# Patient Record
Sex: FEMALE | Race: OTHER | HISPANIC OR LATINO | URBAN - METROPOLITAN AREA
[De-identification: names, ages, dates, MRNs, and addresses within clinical notes are randomized per-mention and may not be internally consistent; named-entity substitution may affect disease eponyms.]

---

## 2020-08-11 ENCOUNTER — EMERGENCY (EMERGENCY)
Facility: HOSPITAL | Age: 20
LOS: 1 days | Discharge: HOME | End: 2020-08-11
Admitting: EMERGENCY MEDICINE
Payer: MEDICAID

## 2020-08-11 VITALS
RESPIRATION RATE: 18 BRPM | SYSTOLIC BLOOD PRESSURE: 107 MMHG | HEART RATE: 95 BPM | OXYGEN SATURATION: 97 % | TEMPERATURE: 99 F | DIASTOLIC BLOOD PRESSURE: 68 MMHG

## 2020-08-11 PROCEDURE — 99284 EMERGENCY DEPT VISIT MOD MDM: CPT | Mod: 25

## 2020-08-11 PROCEDURE — 99053 MED SERV 10PM-8AM 24 HR FAC: CPT

## 2020-08-11 PROCEDURE — 93010 ELECTROCARDIOGRAM REPORT: CPT
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
## 2020-08-11 NOTE — ED PROVIDER NOTE - PATIENT PORTAL LINK FT
You can access the FollowMyHealth Patient Portal offered by [facility] by registering at the following website: [email or web address]. By joining [email or web address]’s FollowMyHealth portal, you will also be able to view your health information using other applications (apps) compatible with our system.

## 2020-08-11 NOTE — ED ADULT TRIAGE NOTE - CHIEF COMPLAINT QUOTE
Pt. with PMHx of bipolar disorder non compliant with her medication c/o suicide ideation with plan to "cut my wrists open." Endorses auditory hallucinations and states the voices are telling her "I need to die." Denies homicide ideation, visual hallucinations. Pt. with PMHx of bipolar disorder non compliant with her medication c/o suicide ideation with plan to "cut my wrists open." Endorses auditory hallucinations and states the voices are telling her "I need to die." Denies homicide ideation, visual hallucinations.  Addendum: Spoke with  NP Silvia who states pt. to be seen in .
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
## 2020-08-11 NOTE — ED PROVIDER NOTE - NS ED ROS FT
+ anxiety, + AH, + interment SI with a plan hang herself from the shower tyrone  + right elbow abrasion

## 2020-08-11 NOTE — ED ADULT NURSE NOTE - CHIEF COMPLAINT QUOTE
Pt. with PMHx of bipolar disorder non compliant with her medication c/o suicide ideation with plan to "cut my wrists open." Endorses auditory hallucinations and states the voices are telling her "I need to die." Denies homicide ideation, visual hallucinations.  Addendum: Spoke with  NP Silvia who states pt. to be seen in .

## 2020-08-11 NOTE — ED PROVIDER NOTE - PROGRESS NOTE DETAILS
Sign out follow-up: Pt medically and psych clear. Pt was initially going to stay overnight in  and go to crisis clinic in AM but would prefer to sleep in her own bed. Will f/u with her own psychiatrist. Will take Uber home. GEMMA

## 2020-08-12 DIAGNOSIS — F10.10 ALCOHOL ABUSE, UNCOMPLICATED: ICD-10-CM

## 2020-08-12 DIAGNOSIS — F43.20 ADJUSTMENT DISORDER, UNSPECIFIED: ICD-10-CM

## 2020-08-12 LAB
ALBUMIN SERPL ELPH-MCNC: 4.8 G/DL — SIGNIFICANT CHANGE UP (ref 3.3–5)
ALP SERPL-CCNC: 55 U/L — SIGNIFICANT CHANGE UP (ref 40–120)
ALT FLD-CCNC: 5 U/L — SIGNIFICANT CHANGE UP (ref 4–33)
ANION GAP SERPL CALC-SCNC: 20 MMO/L — HIGH (ref 7–14)
APAP SERPL-MCNC: < 15 UG/ML — LOW (ref 15–25)
AST SERPL-CCNC: 13 U/L — SIGNIFICANT CHANGE UP (ref 4–32)
BASOPHILS # BLD AUTO: 0.03 K/UL — SIGNIFICANT CHANGE UP (ref 0–0.2)
BASOPHILS NFR BLD AUTO: 0.4 % — SIGNIFICANT CHANGE UP (ref 0–2)
BILIRUB SERPL-MCNC: 0.3 MG/DL — SIGNIFICANT CHANGE UP (ref 0.2–1.2)
BUN SERPL-MCNC: 13 MG/DL — SIGNIFICANT CHANGE UP (ref 7–23)
CALCIUM SERPL-MCNC: 9.8 MG/DL — SIGNIFICANT CHANGE UP (ref 8.4–10.5)
CHLORIDE SERPL-SCNC: 105 MMOL/L — SIGNIFICANT CHANGE UP (ref 98–107)
CO2 SERPL-SCNC: 18 MMOL/L — LOW (ref 22–31)
CREAT SERPL-MCNC: 0.75 MG/DL — SIGNIFICANT CHANGE UP (ref 0.5–1.3)
EOSINOPHIL # BLD AUTO: 0.03 K/UL — SIGNIFICANT CHANGE UP (ref 0–0.5)
EOSINOPHIL NFR BLD AUTO: 0.4 % — SIGNIFICANT CHANGE UP (ref 0–6)
ETHANOL BLD-MCNC: 27 MG/DL — HIGH
GLUCOSE SERPL-MCNC: 97 MG/DL — SIGNIFICANT CHANGE UP (ref 70–99)
HCG SERPL-ACNC: < 5 MIU/ML — SIGNIFICANT CHANGE UP
HCT VFR BLD CALC: 37.7 % — SIGNIFICANT CHANGE UP (ref 34.5–45)
HGB BLD-MCNC: 12.3 G/DL — SIGNIFICANT CHANGE UP (ref 11.5–15.5)
IMM GRANULOCYTES NFR BLD AUTO: 0.3 % — SIGNIFICANT CHANGE UP (ref 0–1.5)
LYMPHOCYTES # BLD AUTO: 1.67 K/UL — SIGNIFICANT CHANGE UP (ref 1–3.3)
LYMPHOCYTES # BLD AUTO: 22.7 % — SIGNIFICANT CHANGE UP (ref 13–44)
MCHC RBC-ENTMCNC: 28.6 PG — SIGNIFICANT CHANGE UP (ref 27–34)
MCHC RBC-ENTMCNC: 32.6 % — SIGNIFICANT CHANGE UP (ref 32–36)
MCV RBC AUTO: 87.7 FL — SIGNIFICANT CHANGE UP (ref 80–100)
MONOCYTES # BLD AUTO: 0.31 K/UL — SIGNIFICANT CHANGE UP (ref 0–0.9)
MONOCYTES NFR BLD AUTO: 4.2 % — SIGNIFICANT CHANGE UP (ref 2–14)
NEUTROPHILS # BLD AUTO: 5.29 K/UL — SIGNIFICANT CHANGE UP (ref 1.8–7.4)
NEUTROPHILS NFR BLD AUTO: 72 % — SIGNIFICANT CHANGE UP (ref 43–77)
NRBC # FLD: 0 K/UL — SIGNIFICANT CHANGE UP (ref 0–0)
PLATELET # BLD AUTO: 232 K/UL — SIGNIFICANT CHANGE UP (ref 150–400)
PMV BLD: 9.9 FL — SIGNIFICANT CHANGE UP (ref 7–13)
POTASSIUM SERPL-MCNC: 3.5 MMOL/L — SIGNIFICANT CHANGE UP (ref 3.5–5.3)
POTASSIUM SERPL-SCNC: 3.5 MMOL/L — SIGNIFICANT CHANGE UP (ref 3.5–5.3)
PROT SERPL-MCNC: 7.2 G/DL — SIGNIFICANT CHANGE UP (ref 6–8.3)
RBC # BLD: 4.3 M/UL — SIGNIFICANT CHANGE UP (ref 3.8–5.2)
RBC # FLD: 12 % — SIGNIFICANT CHANGE UP (ref 10.3–14.5)
SALICYLATES SERPL-MCNC: < 5 MG/DL — LOW (ref 15–30)
SARS-COV-2 RNA SPEC QL NAA+PROBE: SIGNIFICANT CHANGE UP
SODIUM SERPL-SCNC: 143 MMOL/L — SIGNIFICANT CHANGE UP (ref 135–145)
TSH SERPL-MCNC: 0.71 UIU/ML — SIGNIFICANT CHANGE UP (ref 0.27–4.2)
WBC # BLD: 7.35 K/UL — SIGNIFICANT CHANGE UP (ref 3.8–10.5)
WBC # FLD AUTO: 7.35 K/UL — SIGNIFICANT CHANGE UP (ref 3.8–10.5)

## 2020-08-12 PROCEDURE — 90792 PSYCH DIAG EVAL W/MED SRVCS: CPT

## 2020-08-12 RX ORDER — ACETAMINOPHEN 500 MG
975 TABLET ORAL ONCE
Refills: 0 | Status: COMPLETED | OUTPATIENT
Start: 2020-08-12 | End: 2020-08-12

## 2020-08-12 RX ADMIN — Medication 975 MILLIGRAM(S): at 03:32

## 2020-08-12 NOTE — ED BEHAVIORAL HEALTH NOTE - BEHAVIORAL HEALTH NOTE
High Risk Log:   Writer called pt at  [phone] who states she is feeling better.  Still feeling sad because she just found out today her grandmother passed away.  She was offered outpatient follow up, she requested names of places. Writer provided [facility] and [facility].

## 2020-08-12 NOTE — ED ADULT NURSE REASSESSMENT NOTE - NS ED NURSE REASSESS COMMENT FT1
Received pt from RN break coverage, sleeping in bed, NAD, even unlabored respirations observed. Pending DC in am to [facility] crisis clinic. Will continue to monitor for safety.
Labs/ekg/ covid results pending. Psych eval in progress. Pt remains awake, calm, NAD. Will continue to monitor for safety

## 2020-08-12 NOTE — ED BEHAVIORAL HEALTH ASSESSMENT NOTE - SAFETY PLAN ADDT'L DETAILS
Provision of National Suicide Prevention Lifeline 7-293-828-OVIQ (8366)/Education provided regarding environmental safety / lethal means restriction/Safety plan discussed with...

## 2020-08-12 NOTE — ED BEHAVIORAL HEALTH ASSESSMENT NOTE - SUICIDE PROTECTIVE FACTORS
Identifies reasons for living/Responsibility to family and others/Positive therapeutic relationships

## 2020-08-12 NOTE — ED BEHAVIORAL HEALTH ASSESSMENT NOTE - DESCRIPTION
Since hER  ED arrival, the Pt has been calm and cooperative.  There has been no agitation/aggressive behavior.  No verbalization of active/ passive SI/HI.   There are no signs/symptoms of severe MDD, acute elvira or florid psychosis.  The Pt is not experiencing any AH/VH.  Pt is not showing any signs/symptoms of intoxication or withdrawal.  Pt is not delirious.  She has not tested limits.. Has maintained appropriate boundaries. Pt has been easily redirected.  Overall, there has been no management issues.    Vital Signs Last 24 Hrs  T(C): 37.2 (11 Aug 2020 23:13), Max: 37.2 (11 Aug 2020 23:13)  T(F): 99 (11 Aug 2020 23:13), Max: 99 (11 Aug 2020 23:13)  HR: 95 (11 Aug 2020 23:13) (95 - 95)  BP: 107/68 (11 Aug 2020 23:13) (107/68 - 107/68)  BP(mean): --  RR: 18 (11 Aug 2020 23:13) (18 - 18)  SpO2: 97% (11 Aug 2020 23:13) (97% - 97%)  LABS:                     12.3   7.35  )-----------( 232      ( 11 Aug 2020 23:43 )             37.7     11 Aug 2020 23:43  143    |  105    |  13     ----------------------------<  97     3.5     |  18     |  0.75     Ca    9.8        11 Aug 2020 23:43    TPro  7.2    /  Alb  4.8    /  TBili  0.3    /  DBili  x      /  AST  13     /  ALT  5      /  AlkPhos  55     11 Aug 2020 23:43    BAL 27 at 1143PM NONE adopted, her adoptive parents live in [place], [place]. has 13 siblings (knows 7 of them), migrated [place] to [facility] last 8/2019.  Has no relatives here in [facility].  Pt has been working as a commercial sex worker x 3 yrs now. Currently employed at [employer] in [place].  Likes writing music, dancing and singing.  Is not Mosque Since her  ED arrival, the Pt has been calm and cooperative.  There has been no agitation/aggressive behavior.  No verbalization of active/ passive SI/HI.   There are no signs/symptoms of severe MDD, acute elvira or florid psychosis.  The Pt is not experiencing any AH/VH.  Pt is not showing any signs/symptoms of withdrawal.  Pt is not delirious.  She has not tested limits.. Has maintained appropriate boundaries. Pt has been easily redirected.  Overall, there has been no management issues.    Vital Signs Last 24 Hrs  T(C): 37.2 (11 Aug 2020 23:13), Max: 37.2 (11 Aug 2020 23:13)  T(F): 99 (11 Aug 2020 23:13), Max: 99 (11 Aug 2020 23:13)  HR: 95 (11 Aug 2020 23:13) (95 - 95)  BP: 107/68 (11 Aug 2020 23:13) (107/68 - 107/68)  BP(mean): --  RR: 18 (11 Aug 2020 23:13) (18 - 18)  SpO2: 97% (11 Aug 2020 23:13) (97% - 97%)  LABS:                     12.3   7.35  )-----------( 232      ( 11 Aug 2020 23:43 )             37.7     11 Aug 2020 23:43  143    |  105    |  13     ----------------------------<  97     3.5     |  18     |  0.75     Ca    9.8        11 Aug 2020 23:43    TPro  7.2    /  Alb  4.8    /  TBili  0.3    /  DBili  x      /  AST  13     /  ALT  5      /  AlkPhos  55     11 Aug 2020 23:43    BAL 27 at 1143PM
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
## 2020-08-12 NOTE — ED BEHAVIORAL HEALTH ASSESSMENT NOTE - SAFETY PLAN DETAILS
adviced to call 911 or come to the nearest ED should symptoms worsen; have increasing bouts of agitation/aggressive behavior; having SI/HI; or call [phone]

## 2020-08-12 NOTE — ED ADULT NURSE REASSESSMENT NOTE - GENERAL PATIENT STATE
comfortable appearance/cooperative/Remains awake, a&ox4, calm. Denies s/i h/i./improvement verbalized
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
## 2020-08-12 NOTE — ED BEHAVIORAL HEALTH ASSESSMENT NOTE - DESCRIPTION (FIRST USE, LAST USE, QUANTITY, FREQUENCY, DURATION)
occasional drinking alcohol; today, drank 1 glass mary ann admitted to occasionally smoking THC; denied current use

## 2020-08-12 NOTE — ED BEHAVIORAL HEALTH ASSESSMENT NOTE - OTHER PAST PSYCHIATRIC HISTORY (INCLUDE DETAILS REGARDING ONSET, COURSE OF ILLNESS, INPATIENT/OUTPATIENT TREATMENT)
claims to have hx of bipolar depression  3 past in-Pt psych admissions at age 16 (in [place]); last psych hosp when she was 16 yrs old  denied hx of SA but admitted to hx of cutting  - last cut was 3 months ago  no current OP psychiatrist or therapist

## 2020-08-12 NOTE — ED BEHAVIORAL HEALTH ASSESSMENT NOTE - RISK ASSESSMENT
Low Acute Suicide Risk RISK ASSESSMENT:   Modifiable risk factors: depression, anxiety, alcoholism, ? personality pathology (? dependent)  Unmodifiable risk factors: self reported hx of bipolar disorder, prior psych hosps, hx of SIB, co-morbid substance use, family hx of mental illness and hx of SA, poor social support   Protective factors: help seeking and future-oriented, employed, endorses responsibility to family as protective, no access to guns, no hx of SA, no objective findings of acute SI/HI, is not acutely manic or psychotic, no legal issues nor hx of violence, no complex medical issues or hx of chronic pain    Given above, the Pt is currently at low acute suicide risk but is at chronically elevated risk of self-harm.  Apart from the most recent aforementioned psychosocial stressors precipitating and perpetuating Pt's current symptoms, there is no identifiable acute increase in risk that would be mitigated by an involuntary psychiatric admission.
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
## 2020-08-12 NOTE — ED BEHAVIORAL HEALTH ASSESSMENT NOTE - REFERRAL / APPOINTMENT DETAILS
provided referral packet to [facility] walk in crisis center clinic; [facility] and [facility].  Also provided info re: women's shelter

## 2020-08-12 NOTE — ED BEHAVIORAL HEALTH ASSESSMENT NOTE - ADDITIONAL DETAILS ALL
hx of self injurious behavior since she was 16; last cut was 3 months ago - when she is under "a lot of stress"

## 2020-10-27 PROBLEM — F31.9 BIPOLAR DISORDER, UNSPECIFIED: Chronic | Status: ACTIVE | Noted: 2020-08-11

## 2020-11-18 ENCOUNTER — LABORATORY RESULT (OUTPATIENT)
Age: 20
End: 2020-11-18

## 2020-11-18 ENCOUNTER — OUTPATIENT (OUTPATIENT)
Dept: OUTPATIENT SERVICES | Facility: HOSPITAL | Age: 20
LOS: 1 days | End: 2020-11-18
Payer: MEDICAID

## 2020-11-18 ENCOUNTER — NON-APPOINTMENT (OUTPATIENT)
Age: 20
End: 2020-11-18

## 2020-11-18 ENCOUNTER — MED ADMIN CHARGE (OUTPATIENT)
Age: 20
End: 2020-11-18

## 2020-11-18 ENCOUNTER — RESULT REVIEW (OUTPATIENT)
Age: 20
End: 2020-11-18

## 2020-11-18 ENCOUNTER — APPOINTMENT (OUTPATIENT)
Dept: OBGYN | Facility: HOSPITAL | Age: 20
End: 2020-11-18

## 2020-11-18 VITALS
SYSTOLIC BLOOD PRESSURE: 120 MMHG | BODY MASS INDEX: 20.02 KG/M2 | DIASTOLIC BLOOD PRESSURE: 60 MMHG | WEIGHT: 113 LBS | HEIGHT: 63 IN | TEMPERATURE: 99.4 F | HEART RATE: 90 BPM

## 2020-11-18 DIAGNOSIS — F99 MENTAL DISORDER, NOT OTHERWISE SPECIFIED: ICD-10-CM

## 2020-11-18 DIAGNOSIS — Z87.440 PERSONAL HISTORY OF URINARY (TRACT) INFECTIONS: ICD-10-CM

## 2020-11-18 DIAGNOSIS — Z83.3 FAMILY HISTORY OF DIABETES MELLITUS: ICD-10-CM

## 2020-11-18 DIAGNOSIS — Z78.9 OTHER SPECIFIED HEALTH STATUS: ICD-10-CM

## 2020-11-18 DIAGNOSIS — Z82.49 FAMILY HISTORY OF ISCHEMIC HEART DISEASE AND OTHER DISEASES OF THE CIRCULATORY SYSTEM: ICD-10-CM

## 2020-11-18 DIAGNOSIS — Z86.59 PERSONAL HISTORY OF OTHER MENTAL AND BEHAVIORAL DISORDERS: ICD-10-CM

## 2020-11-18 DIAGNOSIS — Z87.09 PERSONAL HISTORY OF OTHER DISEASES OF THE RESPIRATORY SYSTEM: ICD-10-CM

## 2020-11-18 DIAGNOSIS — Z87.42 PERSONAL HISTORY OF OTHER DISEASES OF THE FEMALE GENITAL TRACT: ICD-10-CM

## 2020-11-18 LAB
24R-OH-CALCIDIOL SERPL-MCNC: 23.5 NG/ML — LOW (ref 30–80)
ALBUMIN SERPL ELPH-MCNC: 4.5 G/DL — SIGNIFICANT CHANGE UP (ref 3.3–5)
ALP SERPL-CCNC: 49 U/L — SIGNIFICANT CHANGE UP (ref 40–120)
ALT FLD-CCNC: 8 U/L — SIGNIFICANT CHANGE UP (ref 4–33)
ANION GAP SERPL CALC-SCNC: 12 MMO/L — SIGNIFICANT CHANGE UP (ref 7–14)
APPEARANCE UR: SIGNIFICANT CHANGE UP
AST SERPL-CCNC: 9 U/L — SIGNIFICANT CHANGE UP (ref 4–32)
BACTERIA # UR AUTO: SIGNIFICANT CHANGE UP
BASOPHILS # BLD AUTO: 0.01 K/UL — SIGNIFICANT CHANGE UP (ref 0–0.2)
BASOPHILS NFR BLD AUTO: 0.1 % — SIGNIFICANT CHANGE UP (ref 0–2)
BILIRUB SERPL-MCNC: 0.2 MG/DL — SIGNIFICANT CHANGE UP (ref 0.2–1.2)
BILIRUB UR-MCNC: NEGATIVE — SIGNIFICANT CHANGE UP
BLD GP AB SCN SERPL QL: NEGATIVE — SIGNIFICANT CHANGE UP
BLOOD UR QL VISUAL: SIGNIFICANT CHANGE UP
BUN SERPL-MCNC: 8 MG/DL — SIGNIFICANT CHANGE UP (ref 7–23)
CALCIUM SERPL-MCNC: 9.9 MG/DL — SIGNIFICANT CHANGE UP (ref 8.4–10.5)
CHLORIDE SERPL-SCNC: 102 MMOL/L — SIGNIFICANT CHANGE UP (ref 98–107)
CO2 SERPL-SCNC: 23 MMOL/L — SIGNIFICANT CHANGE UP (ref 22–31)
COLOR SPEC: YELLOW — SIGNIFICANT CHANGE UP
CREAT SERPL-MCNC: 0.54 MG/DL — SIGNIFICANT CHANGE UP (ref 0.5–1.3)
EOSINOPHIL # BLD AUTO: 0.05 K/UL — SIGNIFICANT CHANGE UP (ref 0–0.5)
EOSINOPHIL NFR BLD AUTO: 0.6 % — SIGNIFICANT CHANGE UP (ref 0–6)
GLUCOSE SERPL-MCNC: 88 MG/DL — SIGNIFICANT CHANGE UP (ref 70–99)
GLUCOSE UR-MCNC: NEGATIVE — SIGNIFICANT CHANGE UP
HBA1C BLD-MCNC: 5.3 % — SIGNIFICANT CHANGE UP (ref 4–5.6)
HCT VFR BLD CALC: 31.7 % — LOW (ref 34.5–45)
HGB BLD-MCNC: 10.7 G/DL — LOW (ref 11.5–15.5)
HYALINE CASTS # UR AUTO: HIGH
IMM GRANULOCYTES NFR BLD AUTO: 0.6 % — SIGNIFICANT CHANGE UP (ref 0–1.5)
KETONES UR-MCNC: NEGATIVE — SIGNIFICANT CHANGE UP
LEUKOCYTE ESTERASE UR-ACNC: NEGATIVE — SIGNIFICANT CHANGE UP
LYMPHOCYTES # BLD AUTO: 1.61 K/UL — SIGNIFICANT CHANGE UP (ref 1–3.3)
LYMPHOCYTES # BLD AUTO: 17.9 % — SIGNIFICANT CHANGE UP (ref 13–44)
MCHC RBC-ENTMCNC: 28.7 PG — SIGNIFICANT CHANGE UP (ref 27–34)
MCHC RBC-ENTMCNC: 33.8 % — SIGNIFICANT CHANGE UP (ref 32–36)
MCV RBC AUTO: 85 FL — SIGNIFICANT CHANGE UP (ref 80–100)
MONOCYTES # BLD AUTO: 0.44 K/UL — SIGNIFICANT CHANGE UP (ref 0–0.9)
MONOCYTES NFR BLD AUTO: 4.9 % — SIGNIFICANT CHANGE UP (ref 2–14)
NEUTROPHILS # BLD AUTO: 6.81 K/UL — SIGNIFICANT CHANGE UP (ref 1.8–7.4)
NEUTROPHILS NFR BLD AUTO: 75.9 % — SIGNIFICANT CHANGE UP (ref 43–77)
NITRITE UR-MCNC: NEGATIVE — SIGNIFICANT CHANGE UP
NRBC # FLD: 0 K/UL — SIGNIFICANT CHANGE UP (ref 0–0)
PH UR: 6 — SIGNIFICANT CHANGE UP (ref 5–8)
PLATELET # BLD AUTO: 242 K/UL — SIGNIFICANT CHANGE UP (ref 150–400)
PMV BLD: 9.9 FL — SIGNIFICANT CHANGE UP (ref 7–13)
POTASSIUM SERPL-MCNC: 3.5 MMOL/L — SIGNIFICANT CHANGE UP (ref 3.5–5.3)
POTASSIUM SERPL-SCNC: 3.5 MMOL/L — SIGNIFICANT CHANGE UP (ref 3.5–5.3)
PROT SERPL-MCNC: 6.9 G/DL — SIGNIFICANT CHANGE UP (ref 6–8.3)
PROT UR-MCNC: 20 — SIGNIFICANT CHANGE UP
RBC # BLD: 3.73 M/UL — LOW (ref 3.8–5.2)
RBC # FLD: 11.8 % — SIGNIFICANT CHANGE UP (ref 10.3–14.5)
RBC CASTS # UR COMP ASSIST: SIGNIFICANT CHANGE UP (ref 0–?)
RH IG SCN BLD-IMP: POSITIVE — SIGNIFICANT CHANGE UP
SODIUM SERPL-SCNC: 137 MMOL/L — SIGNIFICANT CHANGE UP (ref 135–145)
SP GR SPEC: 1.03 — SIGNIFICANT CHANGE UP (ref 1–1.04)
SQUAMOUS # UR AUTO: SIGNIFICANT CHANGE UP
URATE SERPL-MCNC: 2.3 MG/DL — LOW (ref 2.5–7)
UROBILINOGEN FLD QL: NORMAL — SIGNIFICANT CHANGE UP
WBC # BLD: 8.97 K/UL — SIGNIFICANT CHANGE UP (ref 3.8–10.5)
WBC # FLD AUTO: 8.97 K/UL — SIGNIFICANT CHANGE UP (ref 3.8–10.5)
WBC UR QL: HIGH (ref 0–?)

## 2020-11-18 PROCEDURE — G0452: CPT

## 2020-11-19 DIAGNOSIS — Z23 ENCOUNTER FOR IMMUNIZATION: ICD-10-CM

## 2020-11-19 DIAGNOSIS — F31.9 BIPOLAR DISORDER, UNSPECIFIED: ICD-10-CM

## 2020-11-19 DIAGNOSIS — Z87.09 PERSONAL HISTORY OF OTHER DISEASES OF THE RESPIRATORY SYSTEM: ICD-10-CM

## 2020-11-19 DIAGNOSIS — Z87.440 PERSONAL HISTORY OF URINARY (TRACT) INFECTIONS: ICD-10-CM

## 2020-11-19 DIAGNOSIS — Z34.90 ENCOUNTER FOR SUPERVISION OF NORMAL PREGNANCY, UNSPECIFIED, UNSPECIFIED TRIMESTER: ICD-10-CM

## 2020-11-19 DIAGNOSIS — F43.10 POST-TRAUMATIC STRESS DISORDER, UNSPECIFIED: ICD-10-CM

## 2020-11-19 DIAGNOSIS — Z86.59 PERSONAL HISTORY OF OTHER MENTAL AND BEHAVIORAL DISORDERS: ICD-10-CM

## 2020-11-19 LAB
CULTURE RESULTS: SIGNIFICANT CHANGE UP
HBV SURFACE AG SER-ACNC: NONREACTIVE — SIGNIFICANT CHANGE UP
HCV AB S/CO SERPL IA: 0.07 S/CO — SIGNIFICANT CHANGE UP (ref 0–0.99)
HCV AB SERPL-IMP: SIGNIFICANT CHANGE UP
HGB A MFR BLD: 95.9 % — SIGNIFICANT CHANGE UP
HGB A2 MFR BLD: 2.8 % — SIGNIFICANT CHANGE UP (ref 2.4–3.5)
HGB ELECT COMMENT: SIGNIFICANT CHANGE UP
HGB F MFR BLD: 1.3 % — SIGNIFICANT CHANGE UP (ref 0–1.5)
HIV 1+2 AB+HIV1 P24 AG SERPL QL IA: SIGNIFICANT CHANGE UP
LEAD SERPL-MCNC: < 1 UG/DL — SIGNIFICANT CHANGE UP (ref 0–4)
MEV IGG SER-ACNC: >300 AU/ML — SIGNIFICANT CHANGE UP
MEV IGG+IGM SER-IMP: POSITIVE — SIGNIFICANT CHANGE UP
RUBV IGG SER-ACNC: 2 INDEX — SIGNIFICANT CHANGE UP
RUBV IGG SER-IMP: POSITIVE — SIGNIFICANT CHANGE UP
SPECIMEN SOURCE: SIGNIFICANT CHANGE UP
T PALLIDUM AB TITR SER: NEGATIVE — SIGNIFICANT CHANGE UP
VZV IGG FLD QL IA: 662.5 INDEX — SIGNIFICANT CHANGE UP
VZV IGG FLD QL IA: POSITIVE — SIGNIFICANT CHANGE UP

## 2020-11-20 LAB
C TRACH RRNA SPEC QL NAA+PROBE: SIGNIFICANT CHANGE UP
GAMMA INTERFERON BACKGROUND BLD IA-ACNC: 0.01 IU/ML — SIGNIFICANT CHANGE UP
M TB IFN-G BLD-IMP: NEGATIVE — SIGNIFICANT CHANGE UP
M TB IFN-G CD4+ BCKGRND COR BLD-ACNC: 0 IU/ML — SIGNIFICANT CHANGE UP
M TB IFN-G CD4+CD8+ BCKGRND COR BLD-ACNC: 0 IU/ML — SIGNIFICANT CHANGE UP
N GONORRHOEA RRNA SPEC QL NAA+PROBE: SIGNIFICANT CHANGE UP
QUANT TB PLUS MITOGEN MINUS NIL: 3.63 IU/ML — SIGNIFICANT CHANGE UP
SPECIMEN SOURCE: SIGNIFICANT CHANGE UP

## 2020-11-21 LAB — CYTOLOGY SPEC DOC CYTO: SIGNIFICANT CHANGE UP

## 2020-11-22 LAB
SARS-COV-2 IGG SERPL QL IA: NEGATIVE — SIGNIFICANT CHANGE UP
SARS-COV-2 IGM SERPL IA-ACNC: 0.09 INDEX — SIGNIFICANT CHANGE UP

## 2020-11-23 ENCOUNTER — NON-APPOINTMENT (OUTPATIENT)
Age: 20
End: 2020-11-23

## 2020-11-26 LAB — CFTR MUT ANL BLD/T: NEGATIVE — SIGNIFICANT CHANGE UP

## 2020-11-27 ENCOUNTER — NON-APPOINTMENT (OUTPATIENT)
Age: 20
End: 2020-11-27

## 2020-11-30 ENCOUNTER — LABORATORY RESULT (OUTPATIENT)
Age: 20
End: 2020-11-30

## 2020-11-30 ENCOUNTER — APPOINTMENT (OUTPATIENT)
Dept: ANTEPARTUM | Facility: CLINIC | Age: 20
End: 2020-11-30
Payer: MEDICAID

## 2020-11-30 ENCOUNTER — ASOB RESULT (OUTPATIENT)
Age: 20
End: 2020-11-30

## 2020-11-30 PROCEDURE — 36416 COLLJ CAPILLARY BLOOD SPEC: CPT

## 2020-11-30 PROCEDURE — 76813 OB US NUCHAL MEAS 1 GEST: CPT | Mod: 26

## 2020-11-30 PROCEDURE — 76801 OB US < 14 WKS SINGLE FETUS: CPT | Mod: 26

## 2020-12-02 ENCOUNTER — OUTPATIENT (OUTPATIENT)
Dept: OUTPATIENT SERVICES | Facility: HOSPITAL | Age: 20
LOS: 1 days | End: 2020-12-02

## 2020-12-02 ENCOUNTER — NON-APPOINTMENT (OUTPATIENT)
Age: 20
End: 2020-12-02

## 2020-12-02 ENCOUNTER — APPOINTMENT (OUTPATIENT)
Dept: OBGYN | Facility: HOSPITAL | Age: 20
End: 2020-12-02

## 2020-12-03 LAB
1ST TRIMESTER DATA: SIGNIFICANT CHANGE UP
ADDENDUM DOC: SIGNIFICANT CHANGE UP
AFP SERPL-ACNC: SIGNIFICANT CHANGE UP
B-HCG FREE SERPL-MCNC: SIGNIFICANT CHANGE UP
CLINICAL BIOCHEMIST REVIEW: SIGNIFICANT CHANGE UP
CLINICAL BIOCHEMIST REVIEW: SIGNIFICANT CHANGE UP
DEMOGRAPHIC DATA: SIGNIFICANT CHANGE UP
NT: SIGNIFICANT CHANGE UP
PAPP-A SERPL-ACNC: SIGNIFICANT CHANGE UP
SCREEN-FOOTER: SIGNIFICANT CHANGE UP
SCREEN-RECOMMENDATIONS: SIGNIFICANT CHANGE UP

## 2020-12-08 DIAGNOSIS — F31.9 BIPOLAR DISORDER, UNSPECIFIED: ICD-10-CM

## 2020-12-08 DIAGNOSIS — F43.10 POST-TRAUMATIC STRESS DISORDER, UNSPECIFIED: ICD-10-CM

## 2020-12-08 DIAGNOSIS — Z34.01 ENCOUNTER FOR SUPERVISION OF NORMAL FIRST PREGNANCY, FIRST TRIMESTER: ICD-10-CM

## 2021-01-04 ENCOUNTER — OUTPATIENT (OUTPATIENT)
Dept: OUTPATIENT SERVICES | Facility: HOSPITAL | Age: 21
LOS: 1 days | Discharge: HOME | End: 2021-01-04

## 2021-01-04 DIAGNOSIS — F41.9 ANXIETY DISORDER, UNSPECIFIED: ICD-10-CM

## 2021-01-06 ENCOUNTER — NON-APPOINTMENT (OUTPATIENT)
Age: 21
End: 2021-01-06

## 2021-01-06 ENCOUNTER — OUTPATIENT (OUTPATIENT)
Dept: OUTPATIENT SERVICES | Facility: HOSPITAL | Age: 21
LOS: 1 days | End: 2021-01-06
Payer: MEDICAID

## 2021-01-06 ENCOUNTER — RESULT REVIEW (OUTPATIENT)
Age: 21
End: 2021-01-06

## 2021-01-06 ENCOUNTER — APPOINTMENT (OUTPATIENT)
Dept: OBGYN | Facility: HOSPITAL | Age: 21
End: 2021-01-06

## 2021-01-06 ENCOUNTER — OUTPATIENT (OUTPATIENT)
Dept: OUTPATIENT SERVICES | Facility: HOSPITAL | Age: 21
LOS: 1 days | Discharge: HOME | End: 2021-01-06

## 2021-01-06 VITALS
WEIGHT: 117 LBS | TEMPERATURE: 99 F | HEART RATE: 99 BPM | SYSTOLIC BLOOD PRESSURE: 109 MMHG | DIASTOLIC BLOOD PRESSURE: 59 MMHG

## 2021-01-06 DIAGNOSIS — F60.3 BORDERLINE PERSONALITY DISORDER: ICD-10-CM

## 2021-01-06 DIAGNOSIS — F41.9 ANXIETY DISORDER, UNSPECIFIED: ICD-10-CM

## 2021-01-06 DIAGNOSIS — F31.9 BIPOLAR DISORDER, UNSPECIFIED: ICD-10-CM

## 2021-01-08 ENCOUNTER — ASOB RESULT (OUTPATIENT)
Age: 21
End: 2021-01-08

## 2021-01-08 ENCOUNTER — APPOINTMENT (OUTPATIENT)
Dept: ANTEPARTUM | Facility: CLINIC | Age: 21
End: 2021-01-08
Payer: MEDICAID

## 2021-01-08 PROCEDURE — 76811 OB US DETAILED SNGL FETUS: CPT | Mod: 26

## 2021-01-12 DIAGNOSIS — Z34.92 ENCOUNTER FOR SUPERVISION OF NORMAL PREGNANCY, UNSPECIFIED, SECOND TRIMESTER: ICD-10-CM

## 2021-01-14 LAB
1ST TRIMESTER DATA: SIGNIFICANT CHANGE UP
2ND TRIMESTER DATA: SIGNIFICANT CHANGE UP
ADDENDUM DOC: SIGNIFICANT CHANGE UP
AFP SERPL-ACNC: SIGNIFICANT CHANGE UP
AFP SERPL-ACNC: SIGNIFICANT CHANGE UP
B-HCG FREE SERPL-MCNC: SIGNIFICANT CHANGE UP
B-HCG FREE SERPL-MCNC: SIGNIFICANT CHANGE UP
CLINICAL BIOCHEMIST REVIEW: SIGNIFICANT CHANGE UP
DEMOGRAPHIC DATA: SIGNIFICANT CHANGE UP
INHIBIN A SERPL-MCNC: SIGNIFICANT CHANGE UP
NT: SIGNIFICANT CHANGE UP
PAPP-A SERPL-ACNC: SIGNIFICANT CHANGE UP
SCREEN-FOOTER: SIGNIFICANT CHANGE UP
U ESTRIOL SERPL-SCNC: SIGNIFICANT CHANGE UP

## 2021-02-05 ENCOUNTER — APPOINTMENT (OUTPATIENT)
Dept: OBGYN | Facility: HOSPITAL | Age: 21
End: 2021-02-05

## 2021-02-11 ENCOUNTER — APPOINTMENT (OUTPATIENT)
Dept: OBGYN | Facility: HOSPITAL | Age: 21
End: 2021-02-11

## 2021-02-11 ENCOUNTER — OUTPATIENT (OUTPATIENT)
Dept: OUTPATIENT SERVICES | Facility: HOSPITAL | Age: 21
LOS: 1 days | End: 2021-02-11

## 2021-02-11 ENCOUNTER — RESULT REVIEW (OUTPATIENT)
Age: 21
End: 2021-02-11

## 2021-02-11 ENCOUNTER — NON-APPOINTMENT (OUTPATIENT)
Age: 21
End: 2021-02-11

## 2021-02-11 VITALS — TEMPERATURE: 98.2 F | SYSTOLIC BLOOD PRESSURE: 112 MMHG | DIASTOLIC BLOOD PRESSURE: 57 MMHG | WEIGHT: 130 LBS

## 2021-02-11 DIAGNOSIS — Z34.92 ENCOUNTER FOR SUPERVISION OF NORMAL PREGNANCY, UNSPECIFIED, SECOND TRIMESTER: ICD-10-CM

## 2021-02-11 PROCEDURE — 99214 OFFICE O/P EST MOD 30 MIN: CPT

## 2021-02-12 LAB
CULTURE RESULTS: SIGNIFICANT CHANGE UP
SPECIMEN SOURCE: SIGNIFICANT CHANGE UP

## 2021-02-23 DIAGNOSIS — F31.9 BIPOLAR DISORDER, UNSPECIFIED: ICD-10-CM

## 2021-02-23 DIAGNOSIS — Z34.92 ENCOUNTER FOR SUPERVISION OF NORMAL PREGNANCY, UNSPECIFIED, SECOND TRIMESTER: ICD-10-CM

## 2021-02-23 DIAGNOSIS — F43.10 POST-TRAUMATIC STRESS DISORDER, UNSPECIFIED: ICD-10-CM

## 2021-03-11 ENCOUNTER — OUTPATIENT (OUTPATIENT)
Dept: OUTPATIENT SERVICES | Facility: HOSPITAL | Age: 21
LOS: 1 days | End: 2021-03-11

## 2021-03-11 ENCOUNTER — MED ADMIN CHARGE (OUTPATIENT)
Age: 21
End: 2021-03-11

## 2021-03-11 ENCOUNTER — RESULT REVIEW (OUTPATIENT)
Age: 21
End: 2021-03-11

## 2021-03-11 ENCOUNTER — NON-APPOINTMENT (OUTPATIENT)
Age: 21
End: 2021-03-11

## 2021-03-11 ENCOUNTER — APPOINTMENT (OUTPATIENT)
Dept: OBGYN | Facility: HOSPITAL | Age: 21
End: 2021-03-11

## 2021-03-11 VITALS
TEMPERATURE: 98.9 F | WEIGHT: 136 LBS | DIASTOLIC BLOOD PRESSURE: 71 MMHG | SYSTOLIC BLOOD PRESSURE: 119 MMHG | HEART RATE: 96 BPM

## 2021-03-11 DIAGNOSIS — F43.10 POST-TRAUMATIC STRESS DISORDER, UNSPECIFIED: ICD-10-CM

## 2021-03-11 DIAGNOSIS — F31.9 BIPOLAR DISORDER, UNSPECIFIED: ICD-10-CM

## 2021-03-11 DIAGNOSIS — Z23 ENCOUNTER FOR IMMUNIZATION: ICD-10-CM

## 2021-03-11 LAB
ANISOCYTOSIS BLD QL: SLIGHT — SIGNIFICANT CHANGE UP
BASOPHILS # BLD AUTO: 0 K/UL — SIGNIFICANT CHANGE UP (ref 0–0.2)
BASOPHILS NFR BLD AUTO: 0 % — SIGNIFICANT CHANGE UP (ref 0–2)
EOSINOPHIL # BLD AUTO: 0.16 K/UL — SIGNIFICANT CHANGE UP (ref 0–0.5)
EOSINOPHIL NFR BLD AUTO: 1.7 % — SIGNIFICANT CHANGE UP (ref 0–6)
GIANT PLATELETS BLD QL SMEAR: PRESENT — SIGNIFICANT CHANGE UP
GLUCOSE 1H P MEAL SERPL-MCNC: 114 MG/DL — SIGNIFICANT CHANGE UP (ref 70–134)
HCT VFR BLD CALC: 32.7 % — LOW (ref 34.5–45)
HGB BLD-MCNC: 10.7 G/DL — LOW (ref 11.5–15.5)
HYPOCHROMIA BLD QL: SLIGHT — SIGNIFICANT CHANGE UP
IANC: 6.4 K/UL — SIGNIFICANT CHANGE UP (ref 1.5–8.5)
LYMPHOCYTES # BLD AUTO: 1.13 K/UL — SIGNIFICANT CHANGE UP (ref 1–3.3)
LYMPHOCYTES # BLD AUTO: 12.3 % — LOW (ref 13–44)
MACROCYTES BLD QL: SLIGHT — SIGNIFICANT CHANGE UP
MCHC RBC-ENTMCNC: 29.8 PG — SIGNIFICANT CHANGE UP (ref 27–34)
MCHC RBC-ENTMCNC: 32.7 GM/DL — SIGNIFICANT CHANGE UP (ref 32–36)
MCV RBC AUTO: 91.1 FL — SIGNIFICANT CHANGE UP (ref 80–100)
METAMYELOCYTES # FLD: 0.9 % — SIGNIFICANT CHANGE UP (ref 0–1)
MONOCYTES # BLD AUTO: 0.41 K/UL — SIGNIFICANT CHANGE UP (ref 0–0.9)
MONOCYTES NFR BLD AUTO: 4.4 % — SIGNIFICANT CHANGE UP (ref 2–14)
MYELOCYTES NFR BLD: 0.9 % — HIGH (ref 0–0)
NEUTROPHILS # BLD AUTO: 7.27 K/UL — SIGNIFICANT CHANGE UP (ref 1.8–7.4)
NEUTROPHILS NFR BLD AUTO: 78.9 % — HIGH (ref 43–77)
PLAT MORPH BLD: NORMAL — SIGNIFICANT CHANGE UP
PLATELET # BLD AUTO: 260 K/UL — SIGNIFICANT CHANGE UP (ref 150–400)
PLATELET COUNT - ESTIMATE: NORMAL — SIGNIFICANT CHANGE UP
POLYCHROMASIA BLD QL SMEAR: SLIGHT — SIGNIFICANT CHANGE UP
RBC # BLD: 3.59 M/UL — LOW (ref 3.8–5.2)
RBC # FLD: 13.4 % — SIGNIFICANT CHANGE UP (ref 10.3–14.5)
RBC BLD AUTO: ABNORMAL
VARIANT LYMPHS # BLD: 0.9 % — SIGNIFICANT CHANGE UP (ref 0–6)
WBC # BLD: 9.21 K/UL — SIGNIFICANT CHANGE UP (ref 3.8–10.5)
WBC # FLD AUTO: 9.21 K/UL — SIGNIFICANT CHANGE UP (ref 3.8–10.5)

## 2021-03-12 LAB
24R-OH-CALCIDIOL SERPL-MCNC: 23.4 NG/ML — LOW (ref 30–80)
T PALLIDUM AB TITR SER: NEGATIVE — SIGNIFICANT CHANGE UP

## 2021-03-13 LAB
CULTURE RESULTS: SIGNIFICANT CHANGE UP
SPECIMEN SOURCE: SIGNIFICANT CHANGE UP

## 2021-03-15 ENCOUNTER — NON-APPOINTMENT (OUTPATIENT)
Age: 21
End: 2021-03-15

## 2021-03-15 DIAGNOSIS — B95.1 STREPTOCOCCUS, GROUP B, AS THE CAUSE OF DISEASES CLASSIFIED ELSEWHERE: ICD-10-CM

## 2021-03-15 DIAGNOSIS — F31.9 BIPOLAR DISORDER, UNSPECIFIED: ICD-10-CM

## 2021-03-15 DIAGNOSIS — Z34.01 ENCOUNTER FOR SUPERVISION OF NORMAL FIRST PREGNANCY, FIRST TRIMESTER: ICD-10-CM

## 2021-03-15 DIAGNOSIS — Z23 ENCOUNTER FOR IMMUNIZATION: ICD-10-CM

## 2021-03-15 DIAGNOSIS — F43.10 POST-TRAUMATIC STRESS DISORDER, UNSPECIFIED: ICD-10-CM

## 2021-03-15 DIAGNOSIS — E55.9 VITAMIN D DEFICIENCY, UNSPECIFIED: ICD-10-CM

## 2021-03-25 ENCOUNTER — APPOINTMENT (OUTPATIENT)
Dept: OBGYN | Facility: HOSPITAL | Age: 21
End: 2021-03-25

## 2021-04-07 ENCOUNTER — NON-APPOINTMENT (OUTPATIENT)
Age: 21
End: 2021-04-07

## 2021-04-15 ENCOUNTER — OUTPATIENT (OUTPATIENT)
Dept: OUTPATIENT SERVICES | Facility: HOSPITAL | Age: 21
LOS: 1 days | End: 2021-04-15

## 2021-04-15 ENCOUNTER — APPOINTMENT (OUTPATIENT)
Dept: OBGYN | Facility: HOSPITAL | Age: 21
End: 2021-04-15

## 2021-04-15 ENCOUNTER — APPOINTMENT (OUTPATIENT)
Dept: ANTEPARTUM | Facility: CLINIC | Age: 21
End: 2021-04-15
Payer: MEDICAID

## 2021-04-15 ENCOUNTER — ASOB RESULT (OUTPATIENT)
Age: 21
End: 2021-04-15

## 2021-04-15 PROCEDURE — 76816 OB US FOLLOW-UP PER FETUS: CPT | Mod: 26

## 2021-04-15 PROCEDURE — 76819 FETAL BIOPHYS PROFIL W/O NST: CPT | Mod: 26

## 2021-04-21 ENCOUNTER — OUTPATIENT (OUTPATIENT)
Dept: OUTPATIENT SERVICES | Facility: HOSPITAL | Age: 21
LOS: 1 days | End: 2021-04-21

## 2021-04-21 ENCOUNTER — APPOINTMENT (OUTPATIENT)
Dept: OBGYN | Facility: HOSPITAL | Age: 21
End: 2021-04-21

## 2021-04-21 ENCOUNTER — NON-APPOINTMENT (OUTPATIENT)
Age: 21
End: 2021-04-21

## 2021-04-21 VITALS — TEMPERATURE: 98.2 F

## 2021-04-21 VITALS — DIASTOLIC BLOOD PRESSURE: 66 MMHG | SYSTOLIC BLOOD PRESSURE: 105 MMHG | WEIGHT: 150 LBS

## 2021-04-21 DIAGNOSIS — R82.71 BACTERIURIA: ICD-10-CM

## 2021-04-22 DIAGNOSIS — Z34.93 ENCOUNTER FOR SUPERVISION OF NORMAL PREGNANCY, UNSPECIFIED, THIRD TRIMESTER: ICD-10-CM

## 2021-04-22 DIAGNOSIS — R82.71 BACTERIURIA: ICD-10-CM

## 2021-05-05 ENCOUNTER — APPOINTMENT (OUTPATIENT)
Dept: OBGYN | Facility: HOSPITAL | Age: 21
End: 2021-05-05

## 2021-05-12 ENCOUNTER — OUTPATIENT (OUTPATIENT)
Dept: OUTPATIENT SERVICES | Facility: HOSPITAL | Age: 21
LOS: 1 days | End: 2021-05-12

## 2021-05-12 ENCOUNTER — NON-APPOINTMENT (OUTPATIENT)
Age: 21
End: 2021-05-12

## 2021-05-12 ENCOUNTER — APPOINTMENT (OUTPATIENT)
Dept: OBGYN | Facility: HOSPITAL | Age: 21
End: 2021-05-12

## 2021-05-12 ENCOUNTER — RESULT REVIEW (OUTPATIENT)
Age: 21
End: 2021-05-12

## 2021-05-12 VITALS
HEART RATE: 110 BPM | TEMPERATURE: 98.1 F | DIASTOLIC BLOOD PRESSURE: 73 MMHG | SYSTOLIC BLOOD PRESSURE: 120 MMHG | WEIGHT: 156 LBS

## 2021-05-13 LAB
C TRACH RRNA SPEC QL NAA+PROBE: SIGNIFICANT CHANGE UP
N GONORRHOEA RRNA SPEC QL NAA+PROBE: SIGNIFICANT CHANGE UP
SPECIMEN SOURCE: SIGNIFICANT CHANGE UP

## 2021-05-14 DIAGNOSIS — Z34.93 ENCOUNTER FOR SUPERVISION OF NORMAL PREGNANCY, UNSPECIFIED, THIRD TRIMESTER: ICD-10-CM

## 2021-05-14 LAB
CULTURE RESULTS: SIGNIFICANT CHANGE UP
SPECIMEN SOURCE: SIGNIFICANT CHANGE UP

## 2021-05-19 ENCOUNTER — APPOINTMENT (OUTPATIENT)
Dept: OBGYN | Facility: HOSPITAL | Age: 21
End: 2021-05-19

## 2021-05-19 ENCOUNTER — EMERGENCY (EMERGENCY)
Facility: HOSPITAL | Age: 21
LOS: 1 days | Discharge: HOME | End: 2021-05-19
Attending: EMERGENCY MEDICINE | Admitting: EMERGENCY MEDICINE
Payer: MEDICAID

## 2021-05-19 ENCOUNTER — OUTPATIENT (OUTPATIENT)
Dept: OUTPATIENT SERVICES | Facility: HOSPITAL | Age: 21
LOS: 1 days | End: 2021-05-19

## 2021-05-19 ENCOUNTER — NON-APPOINTMENT (OUTPATIENT)
Age: 21
End: 2021-05-19

## 2021-05-19 VITALS
SYSTOLIC BLOOD PRESSURE: 100 MMHG | RESPIRATION RATE: 22 BRPM | OXYGEN SATURATION: 98 % | TEMPERATURE: 98 F | HEART RATE: 91 BPM | DIASTOLIC BLOOD PRESSURE: 58 MMHG

## 2021-05-19 VITALS — SYSTOLIC BLOOD PRESSURE: 121 MMHG | DIASTOLIC BLOOD PRESSURE: 57 MMHG | HEART RATE: 116 BPM | WEIGHT: 163 LBS

## 2021-05-19 VITALS
SYSTOLIC BLOOD PRESSURE: 123 MMHG | TEMPERATURE: 98 F | OXYGEN SATURATION: 99 % | DIASTOLIC BLOOD PRESSURE: 61 MMHG | HEART RATE: 111 BPM | RESPIRATION RATE: 17 BRPM

## 2021-05-19 DIAGNOSIS — T78.40XS ALLERGY, UNSPECIFIED, SEQUELA: ICD-10-CM

## 2021-05-19 DIAGNOSIS — T78.40XD ALLERGY, UNSPECIFIED, SUBSEQUENT ENCOUNTER: ICD-10-CM

## 2021-05-19 DIAGNOSIS — Z34.01 ENCOUNTER FOR SUPERVISION OF NORMAL FIRST PREGNANCY, FIRST TRIMESTER: ICD-10-CM

## 2021-05-19 LAB
GLUCOSE BLDC GLUCOMTR-MCNC: 121
SARS-COV-2 RNA SPEC QL NAA+PROBE: SIGNIFICANT CHANGE UP

## 2021-05-19 PROCEDURE — 99234 HOSP IP/OBS SM DT SF/LOW 45: CPT

## 2021-05-19 RX ORDER — DIPHENHYDRAMINE HCL 50 MG
25 CAPSULE ORAL ONCE
Refills: 0 | Status: COMPLETED | OUTPATIENT
Start: 2021-05-19 | End: 2021-05-19

## 2021-05-19 RX ORDER — FAMOTIDINE 10 MG/ML
20 INJECTION INTRAVENOUS ONCE
Refills: 0 | Status: COMPLETED | OUTPATIENT
Start: 2021-05-19 | End: 2021-05-19

## 2021-05-19 RX ORDER — ALBUTEROL 90 UG/1
2.5 AEROSOL, METERED ORAL ONCE
Refills: 0 | Status: COMPLETED | OUTPATIENT
Start: 2021-05-19 | End: 2021-05-19

## 2021-05-19 RX ORDER — SODIUM CHLORIDE 9 MG/ML
1000 INJECTION INTRAMUSCULAR; INTRAVENOUS; SUBCUTANEOUS ONCE
Refills: 0 | Status: COMPLETED | OUTPATIENT
Start: 2021-05-19 | End: 2021-05-19

## 2021-05-19 RX ADMIN — FAMOTIDINE 20 MILLIGRAM(S): 10 INJECTION INTRAVENOUS at 04:11

## 2021-05-19 RX ADMIN — Medication 25 MILLIGRAM(S): at 13:58

## 2021-05-19 RX ADMIN — Medication 125 MILLIGRAM(S): at 04:11

## 2021-05-19 RX ADMIN — SODIUM CHLORIDE 1000 MILLILITER(S): 9 INJECTION INTRAMUSCULAR; INTRAVENOUS; SUBCUTANEOUS at 04:11

## 2021-05-19 RX ADMIN — FAMOTIDINE 20 MILLIGRAM(S): 10 INJECTION INTRAVENOUS at 13:58

## 2021-05-19 RX ADMIN — ALBUTEROL 2.5 MILLIGRAM(S): 90 AEROSOL, METERED ORAL at 04:11

## 2021-05-19 RX ADMIN — Medication 60 MILLIGRAM(S): at 13:58

## 2021-05-19 NOTE — ED PROVIDER NOTE - PROGRESS NOTE DETAILS
Lungs CTA. Eye swelling going down. Throat feels subjectively better. Ob/Gyn paged for JAVIER JACQUES.

## 2021-05-19 NOTE — ED ADULT NURSE NOTE - OBJECTIVE STATEMENT
21 yo F AAOx4 received to Tr A 2/2 allergic rxn, pt woke up ~ 1 hr ago with swelling to b/l eyes/lips and throat tightness, no hx allergies, airway patent with no compromise, resps even unlabored b/l, arrives with #20g placed to LAC en route with 50 mg benadryl given with symptom improvement, will closely monitor and reassess

## 2021-05-19 NOTE — ED CDU PROVIDER INITIAL DAY NOTE - ATTENDING CONTRIBUTION TO CARE
Please see ED provide note attending attestation. I supervised the care of this patient until 8AM on 5/19/21. GEORGIE.

## 2021-05-19 NOTE — ED PROVIDER NOTE - CRITICAL CARE ATTENDING CONTRIBUTION TO CARE
Afebrile. Awake and Alert. Lungs scattered moderate wheezing exp. Heart RRR. Abdomen soft NTND. CN II-XII grossly intact. Moves all extremities without lateralization. Mouth: Oropharynx clear, uvula midline, no tonsilar hypertrophy, exudate or erythema, no anterior cervical lymphadenopathy. No drooling. No hoarseness. b/l upper eyelid moderate swelling.    -Allergic reaction to unknown inciting allergen in 3rd trimester of pregnancy  -Benadryl given by EMS, will give IV solumedrol and pepcid. Given severity of pt's reaction involving the airway and lungs, any theoretical risks of aforementioned medication in pregnancy is outweighed by the survival benefits to the mother.    -L&D c/s for Fetal NST

## 2021-05-19 NOTE — ED CDU PROVIDER DISPOSITION NOTE - CLINICAL COURSE
19yo F w/ 38 weeks pregnant otherwise no significant pmhx observed in CDU for allergic reaction. Patient endorsed eating at [employer] last night, had steak and string beans, never had a reaction to this type of food; however, approximately 2 hours after eating she noted swelling to eyes, tightness of skin and sensation that her airway was becoming constricted. Noted to have wheezing in ED per Dr. Zepeda, pt received solumedrol, pepcid and benadryl, no longer wheezing, denies cp, sob, stridor, tongue swelling, lip swelling. Had NST by OB/GYN team in ED, transferred to CDU for further obs. Patient feeling much improved. Spoke with OBGYN about use of short course of steroid to treat allergic reaction, OK to dc on short course of steroids.   Patient has appointment in clinic today. stable for dc home. will provided f.u with allergist. Cedeño: 19yo F w/ 38 weeks pregnant otherwise no significant pmhx observed in CDU for allergic reaction. Patient endorsed eating at [employer] last night, had steak and string beans, never had a reaction to this type of food; however, approximately 2 hours after eating she noted swelling to eyes, tightness of skin and sensation that her airway was becoming constricted. Noted to have wheezing in ED per Dr. Zepeda, pt received solumedrol, pepcid and benadryl, no longer wheezing, denies cp, sob, stridor, tongue swelling, lip swelling. Had NST by OB/GYN team in ED, transferred to CDU for further obs. Patient feeling much improved. Spoke with OBGYN about use of short course of steroid to treat allergic reaction, OK to dc on short course of steroids.   Patient has appointment in clinic today. stable for dc home. will provided f.u with allergist.

## 2021-05-19 NOTE — ED PROVIDER NOTE - CLINICAL SUMMARY MEDICAL DECISION MAKING FREE TEXT BOX
21 y/o F w/ PMH of bipolar, 38 wks pregnant  presenting w/ allergic reaction. Pt w/ edematous eye, scratchy throat, and wheezes on lung ausculation. Concern for allergic reaction. Already received benadryl. Will give pepcid, steroids, and albuterol. Low threshold for Epi. Will speak w/ OB to perform NST once confirmed stable. Will reassess the need for additional interventions as clinically warranted.

## 2021-05-19 NOTE — ED CDU PROVIDER INITIAL DAY NOTE - PROGRESS NOTE DETAILS
21yo F w/ 38 weeks pregnant otherwise no significant pmhx observed in CDU for allergic reaction. Patient endorsed eating at [employer] last night, had steak and string beans, never had a reaction to this type of food; however, approximately 2 hours after eating she noted swelling to eyes, tightness of skin and sensation that her airway was becoming constricted. Noted to have wheezing in ED per Dr. Zepeda, pt received solumedrol, pepcid and benadryl, no longer wheezing, denies cp, sob, stridor, tongue swelling, lip swelling. Had NST by OB/GYN team in ED, transferred to CDU for further obs. Patient feeling much improved. Spoke with OBGYN about use of short course of steroid to treat allergic reaction, OK to dc on short course of steroids.   Patient has appointment in clinic today. stable for dc home. will provided f.u with allergist.

## 2021-05-19 NOTE — ED PROVIDER NOTE - OBJECTIVE STATEMENT
19 y/o F w/ PMH of bipolar, 38 wks pregnant  presenting w/ allergic reaction. Pt reports shortly prior to arrival she was awoken from sleep and noticed she had a scratchy throat and that her eyes were swollen. She took benadryl at home but that did not improve the symptoms so she called EMS. EMS provided additional dose of benadryl. Pt reports no known allergies. No new foods today. No changes in soaps, detergents, or beauty products. Denies fevers, chills, headache, dizziness, blurred vision, chest pain, cough, shortness of breath, abdominal pain, n/v/d/c, urinary symptoms, MSK pain, rash.

## 2021-05-19 NOTE — ED CDU PROVIDER DISPOSITION NOTE - PATIENT PORTAL LINK FT
You can access the FollowMyHealth Patient Portal offered by [facility] by registering at the following website: [email or web address]. By joining [employer]’s FollowMyHealth portal, you will also be able to view your health information using other applications (apps) compatible with our system.

## 2021-05-19 NOTE — CHART NOTE - NSCHARTNOTEFT_GEN_A_CORE
PATIENT SEEN AT BEDSIDE NOTE TO FOLLOW R2 Chart Note    21yo  @38wks presented to ED after having allergic reaction w/ unknown trigger.  Patient treated with albuterol, pepcid, methylprednisolone and IVF and is currently being monitored for clinical improvement.  OB was contacted for bedside NST, with no obstetrical complaints initially reported by the patient to the ED.  Upon evaluation at bedside patient denies loss of fluids or vaginal bleeding and endorses good fetal movement.  She reports no contractions at this time but states that between 1845 and 2200 last night she felt infrequent contractions.    Sterile vaginal exam performed at bedside revealed a closed and long cervix.  NST with a Category 1 tracing and infrequent uterine irritability, read by senior resident on L&D.    Patient cleared for discharge from OB, return precautions provided to patient.  Discharge as per ED protocol.     d/w Dr. Bass and Dr. Ryley Wright, PGY2

## 2021-05-19 NOTE — ED PROVIDER NOTE - PHYSICAL EXAMINATION
Gen: NAD, AOx3, able to make needs known, non-toxic  Head: NCAT  HEENT: EOMI, oral mucosa moist, normal conjunctiva. Eyelids edematous b/l. Posterior oropharynx and tongue w/o edema.  Lung: Mild wheezing b/l, no respiratory distress, no tachypnea, saturating 98% on room air  CV: RRR, no murmurs  Abd: soft, NTND, no guarding, no CVA tenderness  MSK: no visible deformities  Neuro: Appears non focal  Skin: Warm, well perfused, no rash  Psych: normal affect

## 2021-05-19 NOTE — ED CDU PROVIDER DISPOSITION NOTE - ATTENDING CONTRIBUTION TO CARE
I have seen and evaluated the patient face to face, endorse the HPI, PE, as edited and/or reviewed by me as needed and have indicated my contribution to care in the MDM and/or clincical care section.

## 2021-05-19 NOTE — ED ADULT TRIAGE NOTE - CHIEF COMPLAINT QUOTE
Pt is 38 weeks pregnant JACQUELINE ,woke up an hour ago with throat tightness, swollen eye lids. She took Benadryl 25 mg and EMS gave him Benadryl 50 mg IV. Has 20 G SL in the left AC. pt says her throat feels a little better but still has mild SOB. Denies any abdominal pain, vaginal bleeding or leaking.  reports positive fetal movement. Has no previous allergic reactions as per pt.

## 2021-05-19 NOTE — ED PROVIDER NOTE - CARE PLAN
Principal Discharge DX:	Allergic reaction   Principal Discharge DX:	Allergic reaction  Secondary Diagnosis:	38 weeks gestation of pregnancy

## 2021-05-19 NOTE — ED CDU PROVIDER DISPOSITION NOTE - NSFOLLOWUPINSTRUCTIONS_ED_ALL_ED_FT
Follow up with your PMD within 48-72 hours.  Show copies of your labs provided.      Recommend consult with an Allergist. Referral list provided.      Take Prednisone 40mg daily for 4 days,     Pepcid 20mg 2x/day for 4 days,     Benadryl 25mg every 8 hours for 4 days- caution drowsiness/do not drive.     Worsening or new shortness of breath, tightness in your throat, swelling, chest pain, fever, chills, return to the ER

## 2021-05-19 NOTE — ED CDU PROVIDER INITIAL DAY NOTE - OBJECTIVE STATEMENT
21yo F w/ 38 weeks pregnant otherwise no significant pmhx observed in CDU for allergic reaction. Patient endorsed eating at [employer] last night, had steak and string beans, never had a reaction to this type of food; however, approximately 2 hours after eating she noted swelling to eyes, tightness of skin and sensation that her airway was becoming constricted. Noted to have wheezing in ED per Dr. Zepeda, pt received solumedrol, pepcid and benadryl, no longer wheezing, denies cp, sob, stridor, tongue swelling, lip swelling. Had NST by OB/GYN team in ED, transferred to CDU for further obs.

## 2021-05-24 ENCOUNTER — NON-APPOINTMENT (OUTPATIENT)
Age: 21
End: 2021-05-24

## 2021-05-24 ENCOUNTER — APPOINTMENT (OUTPATIENT)
Dept: ALLERGY | Facility: CLINIC | Age: 21
End: 2021-05-24
Payer: MEDICAID

## 2021-05-24 VITALS
WEIGHT: 163 LBS | TEMPERATURE: 98.6 F | HEART RATE: 102 BPM | SYSTOLIC BLOOD PRESSURE: 100 MMHG | RESPIRATION RATE: 16 BRPM | OXYGEN SATURATION: 98 % | DIASTOLIC BLOOD PRESSURE: 70 MMHG

## 2021-05-24 PROCEDURE — 99204 OFFICE O/P NEW MOD 45 MIN: CPT

## 2021-05-24 RX ORDER — ALBUTEROL SULFATE 90 UG/1
108 (90 BASE) INHALANT RESPIRATORY (INHALATION)
Qty: 1 | Refills: 2 | Status: ACTIVE | COMMUNITY
Start: 2021-05-24 | End: 1900-01-01

## 2021-05-24 RX ORDER — EPINEPHRINE 0.3 MG/.3ML
0.3 INJECTION INTRAMUSCULAR
Qty: 1 | Refills: 0 | Status: ACTIVE | COMMUNITY
Start: 2021-05-24 | End: 1900-01-01

## 2021-05-24 NOTE — ASSESSMENT
[FreeTextEntry1] : Acute angioedema of unclear etiology:\par \par All ingredients in meal eaten prior to the allergic reaction \par EpiPen/Benadryl available at all times\par Albuterol 2 puffs available

## 2021-05-24 NOTE — HISTORY OF PRESENT ILLNESS
[Eczematous rashes] : eczematous rashes [Venom Reactions] : venom reactions [Food Allergies] : food allergies [de-identified] : 1 week ago she awoke at 4 AM - ocular itching - ocular swelling - she rinsed out her eyes - problems opening her eyes - she than felt a sensation of restricted airflow - felt sensation of oral swelling - called 911 - [facility] - she was given IV Benadryl and prednisone - breathing treatment.   Patient is 38 weeks pregnant and she was not given EpiPen.   Her symptoms improved with IV Benadryl.   She was observed for 8 hours.   She was treated with 5 days of prednisone.\par \par Before bed she took prenatal and Benadryl for seasonal allergies - Vitamin D and iron - she has continued these medications since the reaction.   She ate at around 10 PM - ate at [employer] - steak - potatoes - green beans - strawberry lemonade - popcorn - slushy - pretzels.   \par \par Patient with no prior history of food allergies.   She had mild asthma during middle school. \par \par No family history of swelling episodes.

## 2021-05-24 NOTE — PHYSICAL EXAM
[Alert] : alert [Well Nourished] : well nourished [Healthy Appearance] : healthy appearance [No Acute Distress] : no acute distress [Well Developed] : well developed [Normal Voice/Communication] : normal voice communication [Normal Lips/Tongue] : the lips and tongue were normal [Normal Tonsils] : normal tonsils [No Neck Mass] : no neck mass was observed [No LAD] : no lymphadenopathy [Normal Rate and Effort] : normal respiratory rhythm and effort [No Crackles] : no crackles [No Retractions] : no retractions [Normal Rate] : heart rate was normal  [Normal S1, S2] : normal S1 and S2 [No murmur] : no murmur [Regular Rhythm] : with a regular rhythm [Skin Intact] : skin intact  [Normal Mood] : mood was normal [Normal Affect] : affect was normal [Judgment and Insight Age Appropriate] : judgement and insight is age appropriate [Alert, Awake, Oriented as Age-Appropriate] : alert, awake, oriented as age appropriate [Wheezing] : no wheezing was heard

## 2021-05-24 NOTE — SOCIAL HISTORY
[Spouse/Partner] : spouse/partner [Dog] : dog [Single] : single [de-identified] : Boyfriend's family  [FreeTextEntry2] : Not working  [de-identified] : x2

## 2021-05-25 ENCOUNTER — NON-APPOINTMENT (OUTPATIENT)
Age: 21
End: 2021-05-25

## 2021-05-25 ENCOUNTER — OUTPATIENT (OUTPATIENT)
Dept: OUTPATIENT SERVICES | Facility: HOSPITAL | Age: 21
LOS: 1 days | End: 2021-05-25

## 2021-05-25 ENCOUNTER — APPOINTMENT (OUTPATIENT)
Dept: OBGYN | Facility: HOSPITAL | Age: 21
End: 2021-05-25

## 2021-05-25 VITALS
SYSTOLIC BLOOD PRESSURE: 126 MMHG | HEIGHT: 63 IN | DIASTOLIC BLOOD PRESSURE: 70 MMHG | TEMPERATURE: 98.4 F | BODY MASS INDEX: 29.23 KG/M2 | HEART RATE: 104 BPM | WEIGHT: 165 LBS

## 2021-05-25 DIAGNOSIS — T78.40XS ALLERGY, UNSPECIFIED, SEQUELA: ICD-10-CM

## 2021-05-25 DIAGNOSIS — Z34.01 ENCOUNTER FOR SUPERVISION OF NORMAL FIRST PREGNANCY, FIRST TRIMESTER: ICD-10-CM

## 2021-05-25 DIAGNOSIS — Z00.00 ENCOUNTER FOR GENERAL ADULT MEDICAL EXAMINATION WITHOUT ABNORMAL FINDINGS: ICD-10-CM

## 2021-05-26 DIAGNOSIS — Z34.93 ENCOUNTER FOR SUPERVISION OF NORMAL PREGNANCY, UNSPECIFIED, THIRD TRIMESTER: ICD-10-CM

## 2021-05-28 ENCOUNTER — OUTPATIENT (OUTPATIENT)
Dept: INPATIENT UNIT | Facility: HOSPITAL | Age: 21
LOS: 1 days | Discharge: HOME | End: 2021-05-28

## 2021-05-28 ENCOUNTER — EMERGENCY (EMERGENCY)
Facility: HOSPITAL | Age: 21
LOS: 1 days | Discharge: STILL ADMITTED OR ADMINISTRATIVE | End: 2021-05-28
Admitting: EMERGENCY MEDICINE
Payer: MEDICAID

## 2021-05-28 VITALS
RESPIRATION RATE: 18 BRPM | DIASTOLIC BLOOD PRESSURE: 70 MMHG | HEART RATE: 79 BPM | SYSTOLIC BLOOD PRESSURE: 120 MMHG | TEMPERATURE: 98 F | OXYGEN SATURATION: 100 %

## 2021-05-28 VITALS
RESPIRATION RATE: 16 BRPM | HEART RATE: 100 BPM | TEMPERATURE: 99 F | SYSTOLIC BLOOD PRESSURE: 117 MMHG | DIASTOLIC BLOOD PRESSURE: 64 MMHG

## 2021-05-28 DIAGNOSIS — Z3A.00 WEEKS OF GESTATION OF PREGNANCY NOT SPECIFIED: ICD-10-CM

## 2021-05-28 DIAGNOSIS — Z90.49 ACQUIRED ABSENCE OF OTHER SPECIFIED PARTS OF DIGESTIVE TRACT: Chronic | ICD-10-CM

## 2021-05-28 DIAGNOSIS — O26.899 OTHER SPECIFIED PREGNANCY RELATED CONDITIONS, UNSPECIFIED TRIMESTER: ICD-10-CM

## 2021-05-28 PROCEDURE — L9993: CPT

## 2021-05-28 RX ORDER — ERGOCALCIFEROL 1.25 MG/1
0 CAPSULE ORAL
Qty: 0 | Refills: 0 | DISCHARGE

## 2021-05-28 RX ORDER — BENZOYL PEROXIDE MICRONIZED 5.8 %
1 TOWELETTE (EA) TOPICAL
Qty: 0 | Refills: 0 | DISCHARGE

## 2021-05-28 RX ADMIN — SODIUM CHLORIDE 2000 MILLILITER(S): 9 INJECTION, SOLUTION INTRAVENOUS at 23:50

## 2021-05-28 NOTE — ED ADULT NURSE NOTE - CHIEF COMPLAINT QUOTE
PT states is weeks pregnant, water broke approximately 930 pm tonight, experiencing contractions. PT follows with OBGYN at [facility]. MD Grider at bedside for pelvic exam; states able to be transported to L&D. L&D triage PA notified: Pt to be seen in L&D. PT transported in WC with EDT and family at side.

## 2021-05-28 NOTE — ED ADULT TRIAGE NOTE - CHIEF COMPLAINT QUOTE
PT states is weeks pregnant, water broke approximately 930 pm tonight, experiencing contractions. PT follows with OBGYN at [facility]. MD Grider at bedside for pelvic exam; states able to be transported to L&D. L&D triage PA notified: Pt to be seen in L&D. PT transported in WC with EDT and family at side.

## 2021-05-29 VITALS — SYSTOLIC BLOOD PRESSURE: 100 MMHG | DIASTOLIC BLOOD PRESSURE: 56 MMHG | HEART RATE: 80 BPM

## 2021-05-29 RX ORDER — SODIUM CHLORIDE 9 MG/ML
1000 INJECTION, SOLUTION INTRAVENOUS ONCE
Refills: 0 | Status: COMPLETED | OUTPATIENT
Start: 2021-05-29 | End: 2021-05-28

## 2021-05-29 NOTE — OB PROVIDER TRIAGE NOTE - NSOBPROVIDERNOTE_OBGYN_ALL_OB_FT
A/P: Pt is a 20y    presenting for rule out labor/rupture. Ferning/nitrazine neg. BANDAR 12.6. Pt C/L/H.   - pt sent home with standard labor precautions  - pt has a appt in clinic on .     Marko Champion PGY1  Discussed with Dr. Bass

## 2021-05-29 NOTE — OB PROVIDER TRIAGE NOTE - NSHPPHYSICALEXAM_GEN_ALL_CORE
Vital Signs Last 24 Hrs  T(C): 37.1 (28 May 2021 23:16), Max: 37.1 (28 May 2021 23:03)  T(F): 98.78 (28 May 2021 23:16), Max: 98.8 (28 May 2021 23:03)  HR: 100 (28 May 2021 23:19) (79 - 100)  BP: 117/64 (28 May 2021 23:19) (117/64 - 120/70)  BP(mean): --  RR: 16 (28 May 2021 23:03) (16 - 18)  SpO2: 100% (28 May 2021 22:34) (100% - 100%)    Physical Exam:  Gen: NAD, AOx3  CV: RRR  Resp: CTAB  Abd: soft, NT, gravid    SVE: 0/0/-3  FHT: 145, moderate, +accels, no decels  [place]: irregular  Sono: BANDAR 12.6  [place]: Nitrazine neg.  No ferning/no pooling.

## 2021-05-29 NOTE — OB PROVIDER TRIAGE NOTE - ADDITIONAL INSTRUCTIONS
Follow up in clinic on June 1st  Return to labor and delivery if loss of fluid or contractions increase in intensity or frequency
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
## 2021-05-30 ENCOUNTER — OUTPATIENT (OUTPATIENT)
Dept: INPATIENT UNIT | Facility: HOSPITAL | Age: 21
LOS: 1 days | Discharge: HOME | End: 2021-05-30
Payer: MEDICAID

## 2021-05-30 VITALS — HEART RATE: 108 BPM | SYSTOLIC BLOOD PRESSURE: 119 MMHG | DIASTOLIC BLOOD PRESSURE: 63 MMHG

## 2021-05-30 VITALS
SYSTOLIC BLOOD PRESSURE: 121 MMHG | DIASTOLIC BLOOD PRESSURE: 61 MMHG | TEMPERATURE: 99 F | RESPIRATION RATE: 16 BRPM | HEART RATE: 114 BPM

## 2021-05-30 DIAGNOSIS — Z90.49 ACQUIRED ABSENCE OF OTHER SPECIFIED PARTS OF DIGESTIVE TRACT: Chronic | ICD-10-CM

## 2021-05-30 DIAGNOSIS — Z3A.00 WEEKS OF GESTATION OF PREGNANCY NOT SPECIFIED: ICD-10-CM

## 2021-05-30 DIAGNOSIS — O26.899 OTHER SPECIFIED PREGNANCY RELATED CONDITIONS, UNSPECIFIED TRIMESTER: ICD-10-CM

## 2021-05-30 PROCEDURE — 99214 OFFICE O/P EST MOD 30 MIN: CPT | Mod: 25

## 2021-05-30 PROCEDURE — 76818 FETAL BIOPHYS PROFILE W/NST: CPT | Mod: 26

## 2021-05-30 NOTE — OB RN TRIAGE NOTE - MENTAL HEALTH CONDITIONS/SYMPTOMS, PROFILE
not on medication  at present- was on Seroquel/bipolar affective disorder/depression not on medication  at present- was on Seroquel/anxiety disorder/bipolar affective disorder/depression
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
## 2021-06-01 ENCOUNTER — NON-APPOINTMENT (OUTPATIENT)
Age: 21
End: 2021-06-01

## 2021-06-01 ENCOUNTER — APPOINTMENT (OUTPATIENT)
Dept: OBGYN | Facility: HOSPITAL | Age: 21
End: 2021-06-01

## 2021-06-01 ENCOUNTER — OUTPATIENT (OUTPATIENT)
Dept: OUTPATIENT SERVICES | Facility: HOSPITAL | Age: 21
LOS: 1 days | End: 2021-06-01

## 2021-06-01 VITALS
TEMPERATURE: 98.1 F | DIASTOLIC BLOOD PRESSURE: 77 MMHG | SYSTOLIC BLOOD PRESSURE: 122 MMHG | HEART RATE: 103 BPM | WEIGHT: 166 LBS

## 2021-06-01 DIAGNOSIS — Z34.93 ENCOUNTER FOR SUPERVISION OF NORMAL PREGNANCY, UNSPECIFIED, THIRD TRIMESTER: ICD-10-CM

## 2021-06-01 DIAGNOSIS — Z90.49 ACQUIRED ABSENCE OF OTHER SPECIFIED PARTS OF DIGESTIVE TRACT: Chronic | ICD-10-CM

## 2021-06-03 DIAGNOSIS — Z34.93 ENCOUNTER FOR SUPERVISION OF NORMAL PREGNANCY, UNSPECIFIED, THIRD TRIMESTER: ICD-10-CM

## 2021-06-04 ENCOUNTER — NON-APPOINTMENT (OUTPATIENT)
Age: 21
End: 2021-06-04

## 2021-06-04 ENCOUNTER — INPATIENT (INPATIENT)
Facility: HOSPITAL | Age: 21
LOS: 2 days | Discharge: HOME HEALTH | End: 2021-06-07
Attending: SPECIALIST | Admitting: SPECIALIST
Payer: MEDICAID

## 2021-06-04 VITALS — SYSTOLIC BLOOD PRESSURE: 129 MMHG | DIASTOLIC BLOOD PRESSURE: 73 MMHG | HEART RATE: 97 BPM

## 2021-06-04 DIAGNOSIS — Z90.49 ACQUIRED ABSENCE OF OTHER SPECIFIED PARTS OF DIGESTIVE TRACT: Chronic | ICD-10-CM

## 2021-06-04 DIAGNOSIS — Z3A.00 WEEKS OF GESTATION OF PREGNANCY NOT SPECIFIED: ICD-10-CM

## 2021-06-04 DIAGNOSIS — O26.899 OTHER SPECIFIED PREGNANCY RELATED CONDITIONS, UNSPECIFIED TRIMESTER: ICD-10-CM

## 2021-06-04 LAB
BASOPHILS # BLD AUTO: 0.04 K/UL — SIGNIFICANT CHANGE UP (ref 0–0.2)
BASOPHILS NFR BLD AUTO: 0.3 % — SIGNIFICANT CHANGE UP (ref 0–2)
BLD GP AB SCN SERPL QL: NEGATIVE — SIGNIFICANT CHANGE UP
COVID-19 SPIKE DOMAIN AB INTERP: NEGATIVE — SIGNIFICANT CHANGE UP
COVID-19 SPIKE DOMAIN ANTIBODY RESULT: 0.4 U/ML — SIGNIFICANT CHANGE UP
EOSINOPHIL # BLD AUTO: 0.03 K/UL — SIGNIFICANT CHANGE UP (ref 0–0.5)
EOSINOPHIL NFR BLD AUTO: 0.2 % — SIGNIFICANT CHANGE UP (ref 0–6)
HCT VFR BLD CALC: 39.2 % — SIGNIFICANT CHANGE UP (ref 34.5–45)
HGB BLD-MCNC: 13.4 G/DL — SIGNIFICANT CHANGE UP (ref 11.5–15.5)
IANC: 11.47 K/UL — HIGH (ref 1.5–8.5)
IMM GRANULOCYTES NFR BLD AUTO: 0.9 % — SIGNIFICANT CHANGE UP (ref 0–1.5)
LYMPHOCYTES # BLD AUTO: 16.1 % — SIGNIFICANT CHANGE UP (ref 13–44)
LYMPHOCYTES # BLD AUTO: 2.39 K/UL — SIGNIFICANT CHANGE UP (ref 1–3.3)
MCHC RBC-ENTMCNC: 30.6 PG — SIGNIFICANT CHANGE UP (ref 27–34)
MCHC RBC-ENTMCNC: 34.2 GM/DL — SIGNIFICANT CHANGE UP (ref 32–36)
MCV RBC AUTO: 89.5 FL — SIGNIFICANT CHANGE UP (ref 80–100)
MONOCYTES # BLD AUTO: 0.77 K/UL — SIGNIFICANT CHANGE UP (ref 0–0.9)
MONOCYTES NFR BLD AUTO: 5.2 % — SIGNIFICANT CHANGE UP (ref 2–14)
NEUTROPHILS # BLD AUTO: 11.47 K/UL — HIGH (ref 1.8–7.4)
NEUTROPHILS NFR BLD AUTO: 77.3 % — HIGH (ref 43–77)
NRBC # BLD: 0 /100 WBCS — SIGNIFICANT CHANGE UP
NRBC # FLD: 0 K/UL — SIGNIFICANT CHANGE UP
PLATELET # BLD AUTO: 239 K/UL — SIGNIFICANT CHANGE UP (ref 150–400)
RBC # BLD: 4.38 M/UL — SIGNIFICANT CHANGE UP (ref 3.8–5.2)
RBC # FLD: 13.2 % — SIGNIFICANT CHANGE UP (ref 10.3–14.5)
RH IG SCN BLD-IMP: POSITIVE — SIGNIFICANT CHANGE UP
SARS-COV-2 IGG+IGM SERPL QL IA: 0.4 U/ML — SIGNIFICANT CHANGE UP
SARS-COV-2 IGG+IGM SERPL QL IA: NEGATIVE — SIGNIFICANT CHANGE UP
SARS-COV-2 RNA SPEC QL NAA+PROBE: SIGNIFICANT CHANGE UP
T PALLIDUM AB TITR SER: NEGATIVE — SIGNIFICANT CHANGE UP
WBC # BLD: 14.84 K/UL — HIGH (ref 3.8–10.5)
WBC # FLD AUTO: 14.84 K/UL — HIGH (ref 3.8–10.5)

## 2021-06-04 RX ORDER — AMPICILLIN TRIHYDRATE 250 MG
1 CAPSULE ORAL EVERY 4 HOURS
Refills: 0 | Status: DISCONTINUED | OUTPATIENT
Start: 2021-06-04 | End: 2021-06-05

## 2021-06-04 RX ORDER — AMPICILLIN TRIHYDRATE 250 MG
2 CAPSULE ORAL ONCE
Refills: 0 | Status: COMPLETED | OUTPATIENT
Start: 2021-06-04 | End: 2021-06-04

## 2021-06-04 RX ORDER — SODIUM CHLORIDE 9 MG/ML
1000 INJECTION, SOLUTION INTRAVENOUS
Refills: 0 | Status: DISCONTINUED | OUTPATIENT
Start: 2021-06-04 | End: 2021-06-05

## 2021-06-04 RX ORDER — OXYTOCIN 10 UNIT/ML
2 VIAL (ML) INJECTION
Qty: 30 | Refills: 0 | Status: DISCONTINUED | OUTPATIENT
Start: 2021-06-04 | End: 2021-06-04

## 2021-06-04 RX ORDER — OXYTOCIN 10 UNIT/ML
333.33 VIAL (ML) INJECTION
Qty: 20 | Refills: 0 | Status: DISCONTINUED | OUTPATIENT
Start: 2021-06-04 | End: 2021-06-05

## 2021-06-04 RX ORDER — ALBUTEROL 90 UG/1
2 AEROSOL, METERED ORAL EVERY 6 HOURS
Refills: 0 | Status: DISCONTINUED | OUTPATIENT
Start: 2021-06-04 | End: 2021-06-07

## 2021-06-04 RX ADMIN — Medication 216 GRAM(S): at 12:59

## 2021-06-04 RX ADMIN — Medication 108 GRAM(S): at 23:55

## 2021-06-04 RX ADMIN — Medication 108 GRAM(S): at 19:49

## 2021-06-04 RX ADMIN — Medication 108 GRAM(S): at 16:00

## 2021-06-04 RX ADMIN — SODIUM CHLORIDE 125 MILLILITER(S): 9 INJECTION, SOLUTION INTRAVENOUS at 12:58

## 2021-06-04 RX ADMIN — Medication 2 MILLIUNIT(S)/MIN: at 18:02
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
## 2021-06-04 NOTE — OB PROVIDER H&P - ASSESSMENT
admit to L&D for PPROM and labor contractions  For PO cytotec to augment labor  Ampicillin for GBS+ prophylaxis and PPROM  epidural placement for pain relief

## 2021-06-04 NOTE — OB PROVIDER TRIAGE NOTE - HISTORY OF PRESENT ILLNESS
21 y/o  at 40.1 weeks gestation presenting with contractions every 5 minutes and leaking of clear fluid since Wednesday at 21:45. States pain is 10 out of 10 pain. Denies vaginal bleeding. Feels baby move well.

## 2021-06-04 NOTE — OB PROVIDER TRIAGE NOTE - NSHPPHYSICALEXAM_GEN_ALL_CORE
SSE: + pooling clear fluid, + nitrazine  SVE: 1.5/50/-3  TAS: cephalic presentation  FHT: category 1 tracing with 150bpm moderate variability, no decels  Vital Signs Last 24 Hrs  T(C): --  T(F): --  HR: 97 (04 Jun 2021 11:50) (97 - 97)  BP: 129/73 (04 Jun 2021 11:50) (129/73 - 129/73)  BP(mean): --  RR: 15 (04 Jun 2021 11:53) (15 - 15)  SpO2: --
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
## 2021-06-04 NOTE — OB PROVIDER TRIAGE NOTE - NSOBPROVIDERNOTE_OBGYN_ALL_OB_FT
discussed with MD Gomez and MD Robles  admit patient to L&D for PPROM and labor contractions, 40.1 weeks gestation  PO cytotec for induction for PPROM  Ampicillin for GBS+ prophylaxis  approved for epidural placement

## 2021-06-04 NOTE — OB PROVIDER H&P - NS_FHRDECEL_OBGYN_ALL_OB
No Decelerations Complex Repair And Double M Plasty Text: The defect edges were debeveled with a #15 scalpel blade.  The primary defect was closed partially with a complex linear closure.  Given the location of the remaining defect, shape of the defect and the proximity to free margins a double M plasty was deemed most appropriate for complete closure of the defect.  Using a sterile surgical marker, an appropriate advancement flap was drawn incorporating the defect and placing the expected incisions within the relaxed skin tension lines where possible.    The area thus outlined was incised deep to adipose tissue with a #15 scalpel blade.  The skin margins were undermined to an appropriate distance in all directions utilizing iris scissors.

## 2021-06-04 NOTE — OB RN PATIENT PROFILE - MENTAL HEALTH CONDITIONS/SYMPTOMS, PROFILE
not on medication  at present- was on Seroquel/anxiety disorder/bipolar affective disorder/depression/post-traumatic stress disorder

## 2021-06-04 NOTE — OB RN TRIAGE NOTE - MENTAL HEALTH CONDITIONS/SYMPTOMS, PROFILE
not on medication  at present- was on Seroquel/anxiety disorder/bipolar affective disorder/depression not on medication  at present- was on Seroquel/anxiety disorder/bipolar affective disorder/depression/post-traumatic stress disorder

## 2021-06-04 NOTE — OB RN PATIENT PROFILE - PHONE #
Spoke to the patient conveyed lab results. Patient understood results per Doctors notes.   [phone]
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
## 2021-06-05 RX ORDER — DIBUCAINE 1 %
1 OINTMENT (GRAM) RECTAL EVERY 6 HOURS
Refills: 0 | Status: DISCONTINUED | OUTPATIENT
Start: 2021-06-05 | End: 2021-06-07

## 2021-06-05 RX ORDER — OXYCODONE HYDROCHLORIDE 5 MG/1
5 TABLET ORAL ONCE
Refills: 0 | Status: DISCONTINUED | OUTPATIENT
Start: 2021-06-05 | End: 2021-06-07

## 2021-06-05 RX ORDER — MAGNESIUM HYDROXIDE 400 MG/1
30 TABLET, CHEWABLE ORAL
Refills: 0 | Status: DISCONTINUED | OUTPATIENT
Start: 2021-06-05 | End: 2021-06-07

## 2021-06-05 RX ORDER — KETOROLAC TROMETHAMINE 30 MG/ML
30 SYRINGE (ML) INJECTION ONCE
Refills: 0 | Status: DISCONTINUED | OUTPATIENT
Start: 2021-06-05 | End: 2021-06-05

## 2021-06-05 RX ORDER — IBUPROFEN 200 MG
600 TABLET ORAL EVERY 6 HOURS
Refills: 0 | Status: DISCONTINUED | OUTPATIENT
Start: 2021-06-05 | End: 2021-06-07

## 2021-06-05 RX ORDER — ACETAMINOPHEN 500 MG
975 TABLET ORAL
Refills: 0 | Status: DISCONTINUED | OUTPATIENT
Start: 2021-06-05 | End: 2021-06-07

## 2021-06-05 RX ORDER — AER TRAVELER 0.5 G/1
1 SOLUTION RECTAL; TOPICAL EVERY 4 HOURS
Refills: 0 | Status: DISCONTINUED | OUTPATIENT
Start: 2021-06-05 | End: 2021-06-07

## 2021-06-05 RX ORDER — BENZOCAINE 10 %
1 GEL (GRAM) MUCOUS MEMBRANE EVERY 6 HOURS
Refills: 0 | Status: DISCONTINUED | OUTPATIENT
Start: 2021-06-05 | End: 2021-06-07

## 2021-06-05 RX ORDER — HYDROCORTISONE 1 %
1 OINTMENT (GRAM) TOPICAL EVERY 6 HOURS
Refills: 0 | Status: DISCONTINUED | OUTPATIENT
Start: 2021-06-05 | End: 2021-06-07

## 2021-06-05 RX ORDER — SIMETHICONE 80 MG/1
80 TABLET, CHEWABLE ORAL EVERY 4 HOURS
Refills: 0 | Status: DISCONTINUED | OUTPATIENT
Start: 2021-06-05 | End: 2021-06-07

## 2021-06-05 RX ORDER — TETANUS TOXOID, REDUCED DIPHTHERIA TOXOID AND ACELLULAR PERTUSSIS VACCINE, ADSORBED 5; 2.5; 8; 8; 2.5 [IU]/.5ML; [IU]/.5ML; UG/.5ML; UG/.5ML; UG/.5ML
0.5 SUSPENSION INTRAMUSCULAR ONCE
Refills: 0 | Status: DISCONTINUED | OUTPATIENT
Start: 2021-06-05 | End: 2021-06-07

## 2021-06-05 RX ORDER — OXYCODONE HYDROCHLORIDE 5 MG/1
5 TABLET ORAL
Refills: 0 | Status: DISCONTINUED | OUTPATIENT
Start: 2021-06-05 | End: 2021-06-07

## 2021-06-05 RX ORDER — SODIUM CHLORIDE 9 MG/ML
3 INJECTION INTRAMUSCULAR; INTRAVENOUS; SUBCUTANEOUS EVERY 8 HOURS
Refills: 0 | Status: DISCONTINUED | OUTPATIENT
Start: 2021-06-05 | End: 2021-06-07

## 2021-06-05 RX ORDER — OXYTOCIN 10 UNIT/ML
20 VIAL (ML) INJECTION ONCE
Refills: 0 | Status: COMPLETED | OUTPATIENT
Start: 2021-06-05 | End: 2021-06-05

## 2021-06-05 RX ORDER — OXYTOCIN 10 UNIT/ML
2 VIAL (ML) INJECTION
Qty: 30 | Refills: 0 | Status: DISCONTINUED | OUTPATIENT
Start: 2021-06-05 | End: 2021-06-05

## 2021-06-05 RX ORDER — OXYTOCIN 10 UNIT/ML
333.33 VIAL (ML) INJECTION
Qty: 20 | Refills: 0 | Status: DISCONTINUED | OUTPATIENT
Start: 2021-06-05 | End: 2021-06-07

## 2021-06-05 RX ORDER — IBUPROFEN 200 MG
600 TABLET ORAL EVERY 6 HOURS
Refills: 0 | Status: COMPLETED | OUTPATIENT
Start: 2021-06-05 | End: 2022-05-04

## 2021-06-05 RX ORDER — LANOLIN
1 OINTMENT (GRAM) TOPICAL EVERY 6 HOURS
Refills: 0 | Status: DISCONTINUED | OUTPATIENT
Start: 2021-06-05 | End: 2021-06-07

## 2021-06-05 RX ORDER — DIPHENHYDRAMINE HCL 50 MG
25 CAPSULE ORAL EVERY 6 HOURS
Refills: 0 | Status: DISCONTINUED | OUTPATIENT
Start: 2021-06-05 | End: 2021-06-07

## 2021-06-05 RX ORDER — ALBUTEROL 90 UG/1
2 AEROSOL, METERED ORAL EVERY 6 HOURS
Refills: 0 | Status: DISCONTINUED | OUTPATIENT
Start: 2021-06-05 | End: 2021-06-07

## 2021-06-05 RX ORDER — PRAMOXINE HYDROCHLORIDE 150 MG/15G
1 AEROSOL, FOAM RECTAL EVERY 4 HOURS
Refills: 0 | Status: DISCONTINUED | OUTPATIENT
Start: 2021-06-05 | End: 2021-06-07

## 2021-06-05 RX ADMIN — Medication 108 GRAM(S): at 08:00

## 2021-06-05 RX ADMIN — Medication 108 GRAM(S): at 12:00

## 2021-06-05 RX ADMIN — Medication 108 GRAM(S): at 03:51

## 2021-06-05 RX ADMIN — Medication 0.2 MILLIGRAM(S): at 17:25

## 2021-06-05 RX ADMIN — Medication 975 MILLIGRAM(S): at 23:26

## 2021-06-05 RX ADMIN — SODIUM CHLORIDE 3 MILLILITER(S): 9 INJECTION INTRAMUSCULAR; INTRAVENOUS; SUBCUTANEOUS at 23:15

## 2021-06-05 RX ADMIN — ALBUTEROL 2 PUFF(S): 90 AEROSOL, METERED ORAL at 03:52

## 2021-06-05 RX ADMIN — Medication 975 MILLIGRAM(S): at 23:55

## 2021-06-05 RX ADMIN — ALBUTEROL 2 PUFF(S): 90 AEROSOL, METERED ORAL at 19:54

## 2021-06-05 RX ADMIN — Medication 1000 MILLIUNIT(S)/MIN: at 18:37

## 2021-06-05 RX ADMIN — Medication 2 MILLIUNIT(S)/MIN: at 00:32

## 2021-06-05 RX ADMIN — Medication 20 UNIT(S): at 17:37

## 2021-06-05 RX ADMIN — Medication 108 GRAM(S): at 16:00

## 2021-06-05 RX ADMIN — Medication 30 MILLIGRAM(S): at 19:58

## 2021-06-05 RX ADMIN — Medication 30 MILLIGRAM(S): at 23:16

## 2021-06-05 NOTE — OB PROVIDER LABOR PROGRESS NOTE - NS_OBIHIFHRDETAILS_OBGYN_ALL_OB_FT
130 mod deysi +accel - decel
125, moderate, + accels, no decels
150, min deysi, - accels, - decels
140 , min- mod deysi, +accel with scalp stimulation, -decel
140, mod deysi, -accels, -decels
155, min, no accels, no decels
155/mod/accels+/decels-
140, moderate, no accels, no decels

## 2021-06-05 NOTE — OB PROVIDER LABOR PROGRESS NOTE - NS_SUBJECTIVE/OBJECTIVE_OBGYN_ALL_OB_FT
Pt seen and examined for cervical change    cervix noted to be edematous
R1 Progress Note     Patient examined for induction progress.
R4 OB Chart Note     Patient re-examined to assess labor course. Patient came in SROM from 6/2 and was noted to be 1.5/50/-3 and received 2 doses of PO cytotec before being started on pitocin. Patient found to be 2/60/-2 after pitocin and discussed restarting PO cytotec. Patient noted to be loree on own and still delay from pharmacy in obtained PO cytotec and patient re-examined for progress. Now noted to be 4cm/70/-3.
patient examined for cervical change
Pt seen and examined for placement of IUPC
Patient examined for cervical change. Forebag noted, and ruptured.
Pt seen and examined for cervical change
R4 Labor Note    Pt seen and examined at bedside for cervical exam. Patient resting comfortably.    T(C): 36.9 (06-05-21 @ 10:01), Max: 37.5 (06-05-21 @ 02:02)  HR: 114 (06-05-21 @ 12:23) (79 - 114)  BP: 118/73 (06-05-21 @ 12:23) (83/49 - 126/68)  RR: 16 (06-04-21 @ 13:16) (16 - 16)  SpO2: 95% (06-05-21 @ 10:53) (89% - 100%)

## 2021-06-05 NOTE — OB PROVIDER DELIVERY SUMMARY - NSPROVIDERDELIVERYNOTE_OBGYN_ALL_OB_FT
Att: I was present and supervised delivery  -RADHA Bai MD Spontaneous vaginal delivery of liveborn infant from OA position. Head, shoulders, and body delivered easily. Infant was suctioned. 1 minute delayed cord clamping was performed. Cord clamped and cut and infant passed to peds. Placenta delivered intact with a 3 vessel cord. Fundal massage was given. Uterine sweep performed, cavity empty. Intermittent atony noted. Cytotec, methergine, 20U additional pitocin given afterwhich bleeding was minimal and fundus firm. Vaginal exam revealed an intact cervix, vaginal walls, sulci and perineum. Superficial R labial tear reapproximated with 3.0 chromic. Excellent hemostasis was noted. Patient was stable and went to recovery. Count was correct x2.     Tania Goldman MD PGY1   w/ Dr. Dale Thornton        Att: I was present and supervised delivery  -RADHA Bai MD

## 2021-06-05 NOTE — OB RN DELIVERY SUMMARY - NS_SEPSISRSKCALC_OBGYN_ALL_OB_FT
EOS calculated successfully. EOS Risk Factor: 0.5/1000 live births ([facility] national incidence); GA=40w2d; Temp=99.5; ROM=67.333; GBS='Positive'; Antibiotics='GBS specific antibiotics > 2 hrs prior to birth'

## 2021-06-05 NOTE — OB NEONATOLOGY/PEDIATRICIAN DELIVERY SUMMARY - NSPEDSNEONOTESA_OBGYN_ALL_OB_FT
Baby Rell Bledsoe was born at 40.2 wks via VD to a 21 y/o A+ .  COVID-19 neg. Maternal history of bipolar disorder, anxiety, depression, PTSD, hx of chlamydia s/p treatment. Pregnancy/prenatal history unremarkable. PNL neg/NR/immune. GBS pos on . Antibiotics were (amp) given. SROM at 67 hours with clear fluid. Baby born crying and vigorous, and was warmed, dried, stimulated, and suctioned.  Neonatology called at around 7 MOL for grunting, retractions, and hypoxemia; had received approximately 5 min of CPAP 5/21% to maintain SpO2>85%. On arrival, as baby demonstrated good cry and pink mucosa/skin color, transitioned to and observed on RA from 10MOL until 20MOL; no grunting appreciated and SpO2 >95% on RA, intermittent mild nasal flaring and retractions, with good air entry bilaterally and diffuse occasional coarse breath sounds. Note, chest cavity appeared slightly narrow laterally and AP diameter slightly more prominent, but no other dysmorphisms noted on gross exam. APGARS 8 and 9. EOS 0.48 (Maternal T 37.5C). Will be . Guardian consents to circumcision. Guardian consents to Hep B.

## 2021-06-05 NOTE — OB PROVIDER LABOR PROGRESS NOTE - NSVAGINALEXAM_OBGYN_ALL_OB_DT
05-Jun-2021 06:30
05-Jun-2021 08:45
04-Jun-2021 22:27
04-Jun-2021 19:50
05-Jun-2021 00:27
05-Jun-2021 14:30
04-Jun-2021 17:50
05-Jun-2021 12:34

## 2021-06-05 NOTE — OB PROVIDER LABOR PROGRESS NOTE - ASSESSMENT
-IUPC in place, c/w Pit  -cat 1 tracing      Akira PGY1  d/w Dr. Ng
Patient making steady change  - Continue maggie Maynard PGY3  d/w Dr. Nicole
Will continue with pitocin.    Discussed with Dr. Corey Hunter MD PGY4
-IUPC placed  -c/w Pit  -cat 1 tracing    Akira PGY1  d/w Dr. chin
Plan   will start pitocin at 6p   EFM cat 1   cont EFM/[place]  cont ampicillin   anticipate     Tania Goldman MD PGY1  d/w Dr. Mcdowell PGY4
-cat 2 tracing, start resuscitative measures  -cervix still has too much tone. will stop the pit and restart P0 or VC    Akira PGY1  d/w Dr. Ng
21 y/o  @ 40w2d presents with leakage of fluid since . Patient states had been leaking for over a week, and was ruled out for ROM last week. Discussed with patient that given she has been prolonged rupture we are concerned for chorioamnionitis and if tracing persists to be minimal would consider CS. Patient agreeable to any intervention that is safe for her. Discussed with patient that given rupture of forebag and response of tracing to scalp stimulation would continue to monitor. Readjusted IUPC and will continue to titrate pitocin.     MIRTHA Maynard PGY3  d/w Dr. Bai
21yo  at 40w2d presenting w/ PROM from , in stable condition.     Plan of care and tracing review with MD Snow, plan for augmentation with pitocin  Reviewed course with patient and current plan of care, patient in agreement and all questions answered   IUPC remains in place     SOFIA Ng PGY4

## 2021-06-05 NOTE — OB RN DELIVERY SUMMARY - NSSELHIDDEN_OBGYN_ALL_OB_FT
[NS_DeliveryAttending1_OBGYN_ALL_OB_FT:[email or web address]],[NS_DeliveryAssist1_OBGYN_ALL_OB_[id number]],[NS_DeliveryAssist2_OBGYN_ALL_OB_FT:[id number]],[NS_DeliveryRN_OBGYN_ALL_OB_FT:[email or web address]]

## 2021-06-06 RX ADMIN — Medication 600 MILLIGRAM(S): at 04:13

## 2021-06-06 RX ADMIN — Medication 600 MILLIGRAM(S): at 03:21

## 2021-06-06 RX ADMIN — Medication 1 TABLET(S): at 12:42

## 2021-06-06 RX ADMIN — Medication 600 MILLIGRAM(S): at 10:50

## 2021-06-06 RX ADMIN — SODIUM CHLORIDE 3 MILLILITER(S): 9 INJECTION INTRAMUSCULAR; INTRAVENOUS; SUBCUTANEOUS at 05:13

## 2021-06-06 RX ADMIN — Medication 975 MILLIGRAM(S): at 06:34

## 2021-06-06 RX ADMIN — Medication 600 MILLIGRAM(S): at 10:11

## 2021-06-06 RX ADMIN — Medication 975 MILLIGRAM(S): at 18:13

## 2021-06-06 RX ADMIN — Medication 975 MILLIGRAM(S): at 18:40

## 2021-06-06 RX ADMIN — Medication 975 MILLIGRAM(S): at 06:59

## 2021-06-06 RX ADMIN — Medication 975 MILLIGRAM(S): at 12:42

## 2021-06-06 RX ADMIN — SODIUM CHLORIDE 3 MILLILITER(S): 9 INJECTION INTRAMUSCULAR; INTRAVENOUS; SUBCUTANEOUS at 23:00

## 2021-06-06 RX ADMIN — Medication 975 MILLIGRAM(S): at 12:22
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
## 2021-06-06 NOTE — BH CONSULTATION LIAISON ASSESSMENT NOTE - CURRENT MEDICATION
MEDICATIONS  (STANDING):  acetaminophen   Tablet .. 975 milliGRAM(s) Oral <User Schedule>  diphtheria/tetanus/pertussis (acellular) Vaccine (ADAcel) 0.5 milliLiter(s) IntraMuscular once  ibuprofen  Tablet. 600 milliGRAM(s) Oral every 6 hours  oxytocin Infusion 333.333 milliUNIT(s)/Min (1000 mL/Hr) IV Continuous <Continuous>  prenatal multivitamin 1 Tablet(s) Oral daily  sodium chloride 0.9% lock flush 3 milliLiter(s) IV Push every 8 hours    MEDICATIONS  (PRN):  ALBUTerol    90 MICROgram(s) HFA Inhaler 2 Puff(s) Inhalation every 6 hours PRN Shortness of Breath and/or Wheezing  ALBUTerol    90 MICROgram(s) HFA Inhaler 2 Puff(s) Inhalation every 6 hours PRN Shortness of Breath and/or Wheezing  benzocaine 20%/menthol 0.5% Spray 1 Spray(s) Topical every 6 hours PRN for Perineal discomfort  dibucaine 1% Ointment 1 Application(s) Topical every 6 hours PRN Perineal discomfort  diphenhydrAMINE 25 milliGRAM(s) Oral every 6 hours PRN Pruritus  hydrocortisone 1% Cream 1 Application(s) Topical every 6 hours PRN Moderate Pain (4-6)  lanolin Ointment 1 Application(s) Topical every 6 hours PRN nipple soreness  magnesium hydroxide Suspension 30 milliLiter(s) Oral two times a day PRN Constipation  oxyCODONE    IR 5 milliGRAM(s) Oral every 3 hours PRN Moderate to Severe Pain (4-10)  oxyCODONE    IR 5 milliGRAM(s) Oral once PRN Moderate to Severe Pain (4-10)  pramoxine 1%/zinc 5% Cream 1 Application(s) Topical every 4 hours PRN Moderate Pain (4-6)  simethicone 80 milliGRAM(s) Chew every 4 hours PRN Gas  witch hazel Pads 1 Application(s) Topical every 4 hours PRN Perineal discomfort

## 2021-06-06 NOTE — BH CONSULTATION LIAISON ASSESSMENT NOTE - NSBHCHARTREVIEWVS_PSY_A_CORE FT
Vital Signs Last 24 Hrs  T(C): 36.7 (06 Jun 2021 05:36), Max: 37.1 (05 Jun 2021 16:00)  T(F): 98.1 (06 Jun 2021 05:36), Max: 98.78 (05 Jun 2021 16:00)  HR: 87 (06 Jun 2021 05:36) (81 - 236)  BP: 107/65 (06 Jun 2021 05:36) (107/65 - 134/88)  BP(mean): 78 (05 Jun 2021 20:30) (74 - 98)  RR: 18 (06 Jun 2021 05:36) (10 - 26)  SpO2: 98% (06 Jun 2021 05:36) (79% - 100%)

## 2021-06-06 NOTE — BH CONSULTATION LIAISON ASSESSMENT NOTE - DESCRIPTION
adopted, her adoptive parents live in [place], [place]. has 13 siblings (knows 7 of them), migrated from [place] to [facility] last 8/2019.  Has no relatives here in [facility].

## 2021-06-06 NOTE — BH CONSULTATION LIAISON ASSESSMENT NOTE - RISK ASSESSMENT
risk: hx mood disorder w inpt admissions, hx self harm, recent pregnancy  Protective factors:future-oriented, endorses responsibility to family as protective, no access to guns, no hx of SA, no objective findings of acute SI/HI, is not acutely manic or psychotic, no legal issues nor hx of violence    Given above, the Pt is currently at low acute suicide risk but is at chronically elevated risk of self-harm.

## 2021-06-06 NOTE — LACTATION INITIAL EVALUATION - INTERVENTION OUTCOME
discussed benefits of breastfeeding, supply and demand, feeding cues, wet and soiled diaper log, safe skin to skin and safe sleep practices;  encouraged to ask for assistance as needed/verbalizes understanding/demonstrates understanding of teaching/good return demonstration

## 2021-06-06 NOTE — LACTATION INITIAL EVALUATION - LACTATION INTERVENTIONS
assisted to put baby to both breasts in football hold position with deep latch and baby noted to suck with stimultion/initiate skin to skin/initiate hand expression routine/initiate/review early breastfeeding management guidelines/initiate/review techniques for position and latch/post discharge community resources provided/initiate/review breast massage/compression

## 2021-06-06 NOTE — BH CONSULTATION LIAISON ASSESSMENT NOTE - OTHER PAST PSYCHIATRIC HISTORY (INCLUDE DETAILS REGARDING ONSET, COURSE OF ILLNESS, INPATIENT/OUTPATIENT TREATMENT)
no current treatment  3-6 inpt admissions  unclear if last in 2017 or 2020  states she has been dx w bipolar, ptsd, insomnia
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
## 2021-06-07 ENCOUNTER — TRANSCRIPTION ENCOUNTER (OUTPATIENT)
Age: 21
End: 2021-06-07

## 2021-06-07 VITALS
DIASTOLIC BLOOD PRESSURE: 70 MMHG | RESPIRATION RATE: 18 BRPM | HEART RATE: 73 BPM | OXYGEN SATURATION: 97 % | SYSTOLIC BLOOD PRESSURE: 122 MMHG | TEMPERATURE: 98 F

## 2021-06-07 DIAGNOSIS — F31.9 BIPOLAR DISORDER, UNSPECIFIED: ICD-10-CM

## 2021-06-07 PROCEDURE — 90792 PSYCH DIAG EVAL W/MED SRVCS: CPT

## 2021-06-07 RX ORDER — ACETAMINOPHEN 500 MG
3 TABLET ORAL
Qty: 0 | Refills: 0 | DISCHARGE
Start: 2021-06-07

## 2021-06-07 RX ORDER — IBUPROFEN 200 MG
1 TABLET ORAL
Qty: 0 | Refills: 0 | DISCHARGE
Start: 2021-06-07

## 2021-06-07 RX ORDER — NORETHINDRONE 0.35 MG/1
1 TABLET ORAL
Qty: 28 | Refills: 2
Start: 2021-06-07 | End: 2021-08-29

## 2021-06-07 RX ADMIN — Medication 600 MILLIGRAM(S): at 12:25

## 2021-06-07 RX ADMIN — Medication 1 TABLET(S): at 12:25

## 2021-06-07 RX ADMIN — Medication 600 MILLIGRAM(S): at 00:30

## 2021-06-07 RX ADMIN — SODIUM CHLORIDE 3 MILLILITER(S): 9 INJECTION INTRAMUSCULAR; INTRAVENOUS; SUBCUTANEOUS at 06:15

## 2021-06-07 RX ADMIN — Medication 975 MILLIGRAM(S): at 03:40

## 2021-06-07 RX ADMIN — Medication 600 MILLIGRAM(S): at 01:10

## 2021-06-07 RX ADMIN — Medication 975 MILLIGRAM(S): at 04:10

## 2021-06-07 NOTE — PROGRESS NOTE ADULT - PROBLEM SELECTOR PLAN 1
- Pain well controlled, continue current pain regimen  - Increase ambulation, SCDs when not ambulating  - Continue regular diet  - social work to see   - will rx micronor for contraception       Tania Goldman MD PGY1
- Pain well controlled, continue current pain regimen  - Increase ambulation, SCDs when not ambulating  - Continue regular diet  - Micronor rx   - Discharge planning

## 2021-06-07 NOTE — DISCHARGE NOTE OB - COMMUNITY RESOURCE NAME:
Patient instructed to make a follow up appointment at [facility], [phone] for 4-6 weeks from delivery date.

## 2021-06-07 NOTE — PROGRESS NOTE ADULT - ASSESSMENT
A/P: 21yo PPD#2 s/p .  Patient is stable and doing well post-partum.    
A/P: 19yo PPD#1 s/p .  Patient is stable and doing well post-partum.

## 2021-06-07 NOTE — DISCHARGE NOTE OB - MATERIALS PROVIDED
Vaccinations/[facility] [place] Screening Program/[place]  Immunization Record/Breastfeeding Log/Breastfeeding Mother’s Support Group Information/Guide to Postpartum Care/[facility] Hearing Screen Program/Shaken Baby Prevention Handout/Breastfeeding Guide and Packet/Birth Certificate Instructions/Discharge Medication Information for Patients and Families Pocket Guide/MMR Vaccination (VIS Pub Date: 2012)/Tdap Vaccination (VIS Pub Date: 2012)

## 2021-06-07 NOTE — DISCHARGE NOTE OB - HOSPITAL COURSE
Patient had an uncomplicated  followed by an uncomplicated postpartum course. . Patient seen by consult psychiatry during this hospitalization who recommended ______.  On postpartum day 2, patient was discharged home in stable condition, voiding spontaneously and with normal vital signs. Patient to follow up in clinic for PP appointment in 6 weeks.  Patient had an uncomplicated  followed by an uncomplicated postpartum course. . Patient seen by consult psychiatry during this hospitalization who recommended no intervention at this time, information listed in discharge paperwork for [facility]  and crisis center if need arises. Patient cleared for discharge by psych.  On postpartum day 2, patient was discharged home in stable condition, voiding spontaneously and with normal vital signs. Patient to follow up in clinic for PP appointment in 6 weeks.

## 2021-06-07 NOTE — DISCHARGE NOTE OB - CARE PROVIDER_API CALL
[facility],   Ambulatory Care Unit  Oncology Building, Level C  [address] [place] [address] [id number] [phone]  Phone: (   )    -  Fax: (   )    -  Follow Up Time:

## 2021-06-07 NOTE — DISCHARGE NOTE OB - MEDICATION SUMMARY - MEDICATIONS TO TAKE
I will START or STAY ON the medications listed below when I get home from the hospital:    ibuprofen 600 mg oral tablet  -- 1 tab(s) by mouth every 6 hours  -- Indication: For Pain    acetaminophen 325 mg oral tablet  -- 3 tab(s) by mouth   -- Indication: For Pain    PNV Prenatal oral tablet  -- 1 tab(s) by mouth once a day  -- Indication: For Postpartum    Iron 100 Plus oral tablet  -- 1 tab(s) by mouth once a day  -- Indication: For Anemia    Vitamin D2 2000 intl units (50 mcg) oral capsule  -- Indication: For Home med

## 2021-06-07 NOTE — PROGRESS NOTE ADULT - PROBLEM SELECTOR PLAN 2
- seen by   - psych consult placed for extensive psychiatric history including depression, bipolar, PTSD, and hx of suicide attempt  - appreciate psych following, f/u final recs     Tania Goldman MD PGY1

## 2021-06-07 NOTE — DISCHARGE NOTE OB - PROVIDER TOKENS
FREE:[LAST:[[facility]],PHONE:[(   )    -],FAX:[(   )    -],ADDRESS:[Ambulatory Care Unit  Oncology [facility]  [address] [place] [address]  [phone]]]

## 2021-06-07 NOTE — DISCHARGE NOTE OB - CARE PLAN
Principal Discharge DX:	 (normal spontaneous vaginal delivery)  Goal:	Full recovery  Assessment and plan of treatment:	After discharge, please stay on pelvic rest for 6 weeks, meaning no sexual intercourse, no tampons and no douching.  No driving for 2 weeks as women can loose a lot of blood during delivery and there is a possibility of being lightheaded/fainting.  No lifting objects heavier than baby for two weeks.  Expect to have vaginal bleeding/spotting for up to six weeks.  The bleeding should get lighter and more white/light brown with time.  For bleeding soaking more than a pad an hour or passing clots greater than the size of your fist, come in to the emergency department.    Follow up in clinic in 6 weeks.

## 2021-06-07 NOTE — DISCHARGE NOTE OB - ADDITIONAL INSTRUCTIONS
If you experience any depression, anxiety and would like to establish care, please call:  [facility] [phone] (M-F 9am-7pm)   [facility] [phone]

## 2021-06-07 NOTE — PROGRESS NOTE ADULT - ATTENDING COMMENTS
Pt seen and examined by me today. I agree with findings, assessment and plan documented in resident note.  Pt seen by SW who suggests psychiatric evaluation. Otherwise stable.
Associate Chief of L & D (Late entry)     I have met this patient for the first time today.   She was admitted by Dr Robles with PPROM and delivered by Dr Cox  OB Progress Note:  PPD#2    S: 19yo  PPD#2 s/p . Patient denies any complaints at this time   O:  Vitals:  Vital Signs Last 24 Hrs  T(C): 36.8 (2021 05:58), Max: 36.9 (2021 18:25)  T(F): 98.2 (2021 05:58), Max: 98.5 (2021 18:25)  HR: 97 (2021 05:58) (91 - 97)  BP: 124/64 (2021 05:58) (101/- - 124/64)  RR: 18 (2021 05:58) (18 - 18)  SpO2: 100% (2021 05:58) (99% - 100%)    MEDICATIONS  (STANDING):  acetaminophen   Tablet .. 975 milliGRAM(s) Oral <User Schedule>  diphtheria/tetanus/pertussis (acellular) Vaccine (ADAcel) 0.5 milliLiter(s) IntraMuscular once  ibuprofen  Tablet. 600 milliGRAM(s) Oral every 6 hours  prenatal multivitamin 1 Tablet(s) Oral daily  sodium chloride 0.9% lock flush 3 milliLiter(s) IV Push every 8 hours      Labs:  Blood type: A Positive  Rubella IgG: RPR: Negative      Physical Exam:    Abdomen: soft, non-tender, non-distended, fundus firm  Vaginal: Lochia wnl  Extremities: No erythema/edema    A/P: 19yo PPD#2 s/p  and repair of periurethral laceration  - Pain well controlled, continue current pain regimen  - Increase ambulation, SCDs when not ambulating  - Continue regular diet  - Psychiatry note appreciated     Francisca Bonilla M.D., M.B.A., M.S.

## 2021-06-07 NOTE — PROGRESS NOTE ADULT - SUBJECTIVE AND OBJECTIVE BOX
OB Progress Note:  PPD#2    S: 21yo PPD#2 s/p  following induction and prolonged rupture of membranes. Patient feels well. Pain is well controlled, tolerating regular diet, passing flatus, voiding spontaneously, ambulating without difficulty. Denies heavy vaginal bleeding, CP/SOB, lightheadedness/dizziness, N/V.    O:  Vitals:   Vital Signs Last 24 Hrs  T(C): 36.8 (2021 05:58), Max: 36.9 (2021 18:25)  T(F): 98.2 (2021 05:58), Max: 98.5 (2021 18:25)  HR: 97 (2021 05:58) (91 - 97)  BP: 124/64 (2021 05:58) (101/- - 124/64)  BP(mean): --  RR: 18 (2021 05:58) (18 - 18)  SpO2: 100% (2021 05:58) (99% - 100%)    MEDICATIONS  (STANDING):  acetaminophen   Tablet .. 975 milliGRAM(s) Oral <User Schedule>  diphtheria/tetanus/pertussis (acellular) Vaccine (ADAcel) 0.5 milliLiter(s) IntraMuscular once  ibuprofen  Tablet. 600 milliGRAM(s) Oral every 6 hours  prenatal multivitamin 1 Tablet(s) Oral daily  sodium chloride 0.9% lock flush 3 milliLiter(s) IV Push every 8 hours    MEDICATIONS  (PRN):  ALBUTerol    90 MICROgram(s) HFA Inhaler 2 Puff(s) Inhalation every 6 hours PRN Shortness of Breath and/or Wheezing  ALBUTerol    90 MICROgram(s) HFA Inhaler 2 Puff(s) Inhalation every 6 hours PRN Shortness of Breath and/or Wheezing  benzocaine 20%/menthol 0.5% Spray 1 Spray(s) Topical every 6 hours PRN for Perineal discomfort  dibucaine 1% Ointment 1 Application(s) Topical every 6 hours PRN Perineal discomfort  diphenhydrAMINE 25 milliGRAM(s) Oral every 6 hours PRN Pruritus  hydrocortisone 1% Cream 1 Application(s) Topical every 6 hours PRN Moderate Pain (4-6)  lanolin Ointment 1 Application(s) Topical every 6 hours PRN nipple soreness  magnesium hydroxide Suspension 30 milliLiter(s) Oral two times a day PRN Constipation  oxyCODONE    IR 5 milliGRAM(s) Oral every 3 hours PRN Moderate to Severe Pain (4-10)  oxyCODONE    IR 5 milliGRAM(s) Oral once PRN Moderate to Severe Pain (4-10)  pramoxine 1%/zinc 5% Cream 1 Application(s) Topical every 4 hours PRN Moderate Pain (4-6)  simethicone 80 milliGRAM(s) Chew every 4 hours PRN Gas  witch hazel Pads 1 Application(s) Topical every 4 hours PRN Perineal discomfort      Labs:  Blood type: A Positive  Rubella IgG: RPR: Negative                          13.4   14.84<H> >-----------< 239    (  @ 13:01 )             39.2                  Physical Exam:  General: NAD  CV: RRR  Resp: CTAB  Abdomen: soft, non-tender, non-distended, fundus firm  : Nl lochia  Extremities: No erythema/edema    
OB Progress Note:  PPD#1    S: 19yo  PPD#1 s/p . Patient feels well. Pain is well controlled, tolerating regular diet, passing flatus, voiding spontaneously, ambulating without difficulty. Denies heavy vaginal bleeding, CP/SOB, N/V, lightheadedness/dizziness.     O:  Vitals:  Vital Signs Last 24 Hrs  T(C): 36.7 (2021 05:36), Max: 37.4 (2021 12:00)  T(F): 98.1 (2021 05:36), Max: 99.32 (2021 12:00)  HR: 87 (2021 05:36) (81 - 236)  BP: 107/65 (2021 05:36) (83/49 - 134/88)  BP(mean): 78 (2021 20:30) (74 - 98)  RR: 18 (2021 05:36) (10 - 26)  SpO2: 98% (2021 05:36) (79% - 100%)    MEDICATIONS  (STANDING):  acetaminophen   Tablet .. 975 milliGRAM(s) Oral <User Schedule>  diphtheria/tetanus/pertussis (acellular) Vaccine (ADAcel) 0.5 milliLiter(s) IntraMuscular once  ibuprofen  Tablet. 600 milliGRAM(s) Oral every 6 hours  oxytocin Infusion 333.333 milliUNIT(s)/Min (1000 mL/Hr) IV Continuous <Continuous>  prenatal multivitamin 1 Tablet(s) Oral daily  sodium chloride 0.9% lock flush 3 milliLiter(s) IV Push every 8 hours      Labs:  Blood type: A Positive  Rubella IgG: RPR: Negative                          13.4   14.84<H> >-----------< 239    ( 04 @ 13:01 )             39.2                  Physical Exam:  General: NAD  CV: RRR  Resp: CTAB  Abdomen: soft, non-tender, non-distended, fundus firm  : Nl lochia  Extremities: No erythema/edema

## 2021-06-07 NOTE — DISCHARGE NOTE OB - PLAN OF CARE
Full recovery After discharge, please stay on pelvic rest for 6 weeks, meaning no sexual intercourse, no tampons and no douching.  No driving for 2 weeks as women can loose a lot of blood during delivery and there is a possibility of being lightheaded/fainting.  No lifting objects heavier than baby for two weeks.  Expect to have vaginal bleeding/spotting for up to six weeks.  The bleeding should get lighter and more white/light brown with time.  For bleeding soaking more than a pad an hour or passing clots greater than the size of your fist, come in to the emergency department.    Follow up in clinic in 6 weeks.

## 2021-06-07 NOTE — DISCHARGE NOTE OB - PATIENT PORTAL LINK FT
You can access the FollowMyHealth Patient Portal offered by [facility] by registering at the following website: [email or web address]. By joining [facility]’s FollowMyHealth portal, you will also be able to view your health information using other applications (apps) compatible with our system.

## 2021-06-08 ENCOUNTER — APPOINTMENT (OUTPATIENT)
Dept: ANTEPARTUM | Facility: CLINIC | Age: 21
End: 2021-06-08

## 2021-06-08 ENCOUNTER — NON-APPOINTMENT (OUTPATIENT)
Age: 21
End: 2021-06-08

## 2021-06-08 ENCOUNTER — APPOINTMENT (OUTPATIENT)
Dept: OBGYN | Facility: HOSPITAL | Age: 21
End: 2021-06-08

## 2021-06-08 PROBLEM — F43.10 POST-TRAUMATIC STRESS DISORDER, UNSPECIFIED: Chronic | Status: ACTIVE | Noted: 2021-06-04

## 2021-06-08 PROBLEM — J45.20 MILD INTERMITTENT ASTHMA, UNCOMPLICATED: Chronic | Status: ACTIVE | Noted: 2021-06-04

## 2021-06-08 PROBLEM — F32.89 OTHER SPECIFIED DEPRESSIVE EPISODES: Chronic | Status: ACTIVE | Noted: 2021-06-04

## 2021-06-08 PROBLEM — Z62.810 PERSONAL HISTORY OF PHYSICAL AND SEXUAL ABUSE IN CHILDHOOD: Chronic | Status: ACTIVE | Noted: 2021-06-04

## 2021-06-08 PROBLEM — D50.9 IRON DEFICIENCY ANEMIA, UNSPECIFIED: Chronic | Status: ACTIVE | Noted: 2021-06-04

## 2021-06-08 PROBLEM — Z91.5 PERSONAL HISTORY OF SELF-HARM: Chronic | Status: ACTIVE | Noted: 2021-06-04

## 2021-06-08 PROBLEM — Z86.19 PERSONAL HISTORY OF OTHER INFECTIOUS AND PARASITIC DISEASES: Chronic | Status: ACTIVE | Noted: 2021-06-04

## 2021-06-08 PROBLEM — N83.209 UNSPECIFIED OVARIAN CYST, UNSPECIFIED SIDE: Chronic | Status: ACTIVE | Noted: 2021-06-04

## 2021-06-09 ENCOUNTER — NON-APPOINTMENT (OUTPATIENT)
Age: 21
End: 2021-06-09

## 2021-06-09 RX ORDER — CHLORHEXIDINE GLUCONATE 4 %
325 (65 FE) LIQUID (ML) TOPICAL DAILY
Refills: 0 | Status: ACTIVE | COMMUNITY
Start: 2021-06-09

## 2021-06-09 RX ORDER — IBUPROFEN 600 MG/1
600 TABLET, FILM COATED ORAL EVERY 6 HOURS
Refills: 0 | Status: ACTIVE | COMMUNITY
Start: 2021-06-09

## 2021-06-09 RX ORDER — CHOLECALCIFEROL (VITAMIN D3) 125 MCG
50 MCG TABLET ORAL
Refills: 0 | Status: ACTIVE | COMMUNITY
Start: 2021-06-09

## 2021-06-09 RX ORDER — ACETAMINOPHEN 500 MG/1
500 TABLET ORAL
Refills: 0 | Status: ACTIVE | COMMUNITY
Start: 2021-06-09

## 2021-06-10 ENCOUNTER — NON-APPOINTMENT (OUTPATIENT)
Age: 21
End: 2021-06-10

## 2021-06-14 ENCOUNTER — NON-APPOINTMENT (OUTPATIENT)
Age: 21
End: 2021-06-14

## 2021-06-28 ENCOUNTER — NON-APPOINTMENT (OUTPATIENT)
Age: 21
End: 2021-06-28

## 2021-07-07 ENCOUNTER — NON-APPOINTMENT (OUTPATIENT)
Age: 21
End: 2021-07-07

## 2021-07-16 ENCOUNTER — APPOINTMENT (OUTPATIENT)
Dept: OBGYN | Facility: HOSPITAL | Age: 21
End: 2021-07-16

## 2021-08-03 ENCOUNTER — APPOINTMENT (OUTPATIENT)
Dept: OBGYN | Facility: HOSPITAL | Age: 21
End: 2021-08-03

## 2021-08-04 ENCOUNTER — APPOINTMENT (OUTPATIENT)
Dept: OBGYN | Facility: HOSPITAL | Age: 21
End: 2021-08-04

## 2021-08-04 ENCOUNTER — NON-APPOINTMENT (OUTPATIENT)
Age: 21
End: 2021-08-04

## 2021-08-10 ENCOUNTER — APPOINTMENT (OUTPATIENT)
Dept: OBGYN | Facility: HOSPITAL | Age: 21
End: 2021-08-10

## 2021-08-17 ENCOUNTER — APPOINTMENT (OUTPATIENT)
Dept: OBGYN | Facility: HOSPITAL | Age: 21
End: 2021-08-17

## 2021-09-08 ENCOUNTER — APPOINTMENT (OUTPATIENT)
Dept: OBGYN | Facility: HOSPITAL | Age: 21
End: 2021-09-08

## 2021-11-30 ENCOUNTER — APPOINTMENT (OUTPATIENT)
Dept: OBGYN | Facility: HOSPITAL | Age: 21
End: 2021-11-30

## 2021-12-29 ENCOUNTER — APPOINTMENT (OUTPATIENT)
Dept: OBGYN | Facility: HOSPITAL | Age: 21
End: 2021-12-29

## 2022-02-08 ENCOUNTER — RESULT REVIEW (OUTPATIENT)
Age: 22
End: 2022-02-08

## 2022-02-08 ENCOUNTER — APPOINTMENT (OUTPATIENT)
Dept: OBGYN | Facility: HOSPITAL | Age: 22
End: 2022-02-08

## 2022-02-08 ENCOUNTER — OUTPATIENT (OUTPATIENT)
Dept: OUTPATIENT SERVICES | Facility: HOSPITAL | Age: 22
LOS: 1 days | End: 2022-02-08

## 2022-02-08 VITALS
SYSTOLIC BLOOD PRESSURE: 110 MMHG | TEMPERATURE: 98.4 F | HEIGHT: 62 IN | WEIGHT: 165 LBS | BODY MASS INDEX: 30.36 KG/M2 | HEART RATE: 79 BPM | DIASTOLIC BLOOD PRESSURE: 62 MMHG

## 2022-02-08 DIAGNOSIS — Z90.49 ACQUIRED ABSENCE OF OTHER SPECIFIED PARTS OF DIGESTIVE TRACT: Chronic | ICD-10-CM

## 2022-02-08 DIAGNOSIS — Z01.419 ENCOUNTER FOR GYNECOLOGICAL EXAMINATION (GENERAL) (ROUTINE) W/OUT ABNORMAL FINDINGS: ICD-10-CM

## 2022-02-08 DIAGNOSIS — Z30.011 ENCOUNTER FOR INITIAL PRESCRIPTION OF CONTRACEPTIVE PILLS: ICD-10-CM

## 2022-02-08 LAB
ALBUMIN SERPL ELPH-MCNC: 4.9 G/DL — SIGNIFICANT CHANGE UP (ref 3.3–5)
ALP SERPL-CCNC: 108 U/L — SIGNIFICANT CHANGE UP (ref 40–120)
ALT FLD-CCNC: 14 U/L — SIGNIFICANT CHANGE UP (ref 4–33)
ANION GAP SERPL CALC-SCNC: 12 MMOL/L — SIGNIFICANT CHANGE UP (ref 7–14)
AST SERPL-CCNC: 12 U/L — SIGNIFICANT CHANGE UP (ref 4–32)
BASOPHILS # BLD AUTO: 0.03 K/UL — SIGNIFICANT CHANGE UP (ref 0–0.2)
BASOPHILS NFR BLD AUTO: 0.4 % — SIGNIFICANT CHANGE UP (ref 0–2)
BILIRUB SERPL-MCNC: 0.3 MG/DL — SIGNIFICANT CHANGE UP (ref 0.2–1.2)
BUN SERPL-MCNC: 14 MG/DL — SIGNIFICANT CHANGE UP (ref 7–23)
CALCIUM SERPL-MCNC: 9.2 MG/DL — SIGNIFICANT CHANGE UP (ref 8.4–10.5)
CHLORIDE SERPL-SCNC: 102 MMOL/L — SIGNIFICANT CHANGE UP (ref 98–107)
CO2 SERPL-SCNC: 21 MMOL/L — LOW (ref 22–31)
CREAT SERPL-MCNC: 0.77 MG/DL — SIGNIFICANT CHANGE UP (ref 0.5–1.3)
EOSINOPHIL # BLD AUTO: 0.05 K/UL — SIGNIFICANT CHANGE UP (ref 0–0.5)
EOSINOPHIL NFR BLD AUTO: 0.7 % — SIGNIFICANT CHANGE UP (ref 0–6)
GLUCOSE SERPL-MCNC: 97 MG/DL — SIGNIFICANT CHANGE UP (ref 70–99)
HCG SERPL-ACNC: 18 MIU/ML — SIGNIFICANT CHANGE UP
HCT VFR BLD CALC: 39.9 % — SIGNIFICANT CHANGE UP (ref 34.5–45)
HGB BLD-MCNC: 13.1 G/DL — SIGNIFICANT CHANGE UP (ref 11.5–15.5)
HIV 1+2 AB+HIV1 P24 AG SERPL QL IA: SIGNIFICANT CHANGE UP
IANC: 4.92 K/UL — SIGNIFICANT CHANGE UP (ref 1.5–8.5)
IMM GRANULOCYTES NFR BLD AUTO: 0.4 % — SIGNIFICANT CHANGE UP (ref 0–1.5)
LYMPHOCYTES # BLD AUTO: 1.81 K/UL — SIGNIFICANT CHANGE UP (ref 1–3.3)
LYMPHOCYTES # BLD AUTO: 25.1 % — SIGNIFICANT CHANGE UP (ref 13–44)
MCHC RBC-ENTMCNC: 27.8 PG — SIGNIFICANT CHANGE UP (ref 27–34)
MCHC RBC-ENTMCNC: 32.8 GM/DL — SIGNIFICANT CHANGE UP (ref 32–36)
MCV RBC AUTO: 84.7 FL — SIGNIFICANT CHANGE UP (ref 80–100)
MONOCYTES # BLD AUTO: 0.37 K/UL — SIGNIFICANT CHANGE UP (ref 0–0.9)
MONOCYTES NFR BLD AUTO: 5.1 % — SIGNIFICANT CHANGE UP (ref 2–14)
NEUTROPHILS # BLD AUTO: 4.92 K/UL — SIGNIFICANT CHANGE UP (ref 1.8–7.4)
NEUTROPHILS NFR BLD AUTO: 68.3 % — SIGNIFICANT CHANGE UP (ref 43–77)
NRBC # BLD: 0 /100 WBCS — SIGNIFICANT CHANGE UP
NRBC # FLD: 0 K/UL — SIGNIFICANT CHANGE UP
PLATELET # BLD AUTO: 288 K/UL — SIGNIFICANT CHANGE UP (ref 150–400)
POTASSIUM SERPL-MCNC: 3.7 MMOL/L — SIGNIFICANT CHANGE UP (ref 3.5–5.3)
POTASSIUM SERPL-SCNC: 3.7 MMOL/L — SIGNIFICANT CHANGE UP (ref 3.5–5.3)
PROT SERPL-MCNC: 7.3 G/DL — SIGNIFICANT CHANGE UP (ref 6–8.3)
RBC # BLD: 4.71 M/UL — SIGNIFICANT CHANGE UP (ref 3.8–5.2)
RBC # FLD: 12.6 % — SIGNIFICANT CHANGE UP (ref 10.3–14.5)
SODIUM SERPL-SCNC: 135 MMOL/L — SIGNIFICANT CHANGE UP (ref 135–145)
T4 FREE SERPL-MCNC: 1.2 NG/DL — SIGNIFICANT CHANGE UP (ref 0.9–1.8)
TSH SERPL-MCNC: 1.05 UIU/ML — SIGNIFICANT CHANGE UP (ref 0.27–4.2)
WBC # BLD: 7.21 K/UL — SIGNIFICANT CHANGE UP (ref 3.8–10.5)
WBC # FLD AUTO: 7.21 K/UL — SIGNIFICANT CHANGE UP (ref 3.8–10.5)

## 2022-02-09 ENCOUNTER — NON-APPOINTMENT (OUTPATIENT)
Age: 22
End: 2022-02-09

## 2022-02-09 DIAGNOSIS — Z30.011 ENCOUNTER FOR INITIAL PRESCRIPTION OF CONTRACEPTIVE PILLS: ICD-10-CM

## 2022-02-09 DIAGNOSIS — Z01.419 ENCOUNTER FOR GYNECOLOGICAL EXAMINATION (GENERAL) (ROUTINE) WITHOUT ABNORMAL FINDINGS: ICD-10-CM

## 2022-02-09 LAB
C TRACH RRNA SPEC QL NAA+PROBE: SIGNIFICANT CHANGE UP
HBV SURFACE AG SER-ACNC: SIGNIFICANT CHANGE UP
HCV AB S/CO SERPL IA: 0.09 S/CO — SIGNIFICANT CHANGE UP (ref 0–0.99)
HCV AB SERPL-IMP: SIGNIFICANT CHANGE UP
N GONORRHOEA RRNA SPEC QL NAA+PROBE: SIGNIFICANT CHANGE UP
SPECIMEN SOURCE: SIGNIFICANT CHANGE UP
T PALLIDUM AB TITR SER: NEGATIVE — SIGNIFICANT CHANGE UP

## 2022-02-09 NOTE — HISTORY OF PRESENT ILLNESS
[Currently Active] : currently active [Men] : men [Vaginal] : vaginal [No] : No [Patient would like to be screened for STIs] : Patient would like to be screened for STIs [FreeTextEntry1] : 22 yo  LMP 1/10/22 presents for GYN annual and birth control counseling. She is doing well today without complaints. States she is about to have a period but took a pregnancy test yesterday which was negative. Her period tracker on her phone says she is 2 days late today. She has not been using anything for contraception since having the baby. She stopped breastfeeding after about 2 months and then resumed having a regular cycle. She did have covid twice, once in the fall and once in the winter of 2021 despite being fully vaccinated. She is feeling well now. \par She is interested in starting OCPs for birth control, has used them in the past without issue. \par She is sexually active with same monogamous male partner. would like STI testing today. \par Still sees psychiatrist once weekly on telehealth- started abilify which she is satisfied with. \par

## 2022-02-09 NOTE — PHYSICAL EXAM

## 2022-02-09 NOTE — DISCUSSION/SUMMARY
[FreeTextEntry1] : 22 yo  LMP 1/10/22 presents for GYN annual and birth control counseling. \par \par 1. GYN annual\par -pap 11/2020\par -routine labs\par -STI testing\par \par 2. Contraception\par -after counseling pt elects to start OCP again\par -UCG negative today, will do serum HCG given is late on period and has not been using contraception. Will await result before sending pills\par -plan to send lessina x 6 mo\par \par RTC 6 mo or prn

## 2022-02-15 ENCOUNTER — OUTPATIENT (OUTPATIENT)
Dept: OUTPATIENT SERVICES | Facility: HOSPITAL | Age: 22
LOS: 1 days | End: 2022-02-15

## 2022-02-15 ENCOUNTER — APPOINTMENT (OUTPATIENT)
Dept: OBGYN | Facility: HOSPITAL | Age: 22
End: 2022-02-15
Payer: MEDICAID

## 2022-02-15 VITALS
WEIGHT: 163 LBS | BODY MASS INDEX: 30 KG/M2 | DIASTOLIC BLOOD PRESSURE: 65 MMHG | HEART RATE: 95 BPM | TEMPERATURE: 98.2 F | HEIGHT: 62 IN | SYSTOLIC BLOOD PRESSURE: 128 MMHG

## 2022-02-15 DIAGNOSIS — Z90.49 ACQUIRED ABSENCE OF OTHER SPECIFIED PARTS OF DIGESTIVE TRACT: Chronic | ICD-10-CM

## 2022-02-15 DIAGNOSIS — Z00.00 ENCOUNTER FOR GENERAL ADULT MEDICAL EXAMINATION W/OUT ABNORMAL FINDINGS: ICD-10-CM

## 2022-02-15 LAB — HCG SERPL-ACNC: 1035 MIU/ML — SIGNIFICANT CHANGE UP

## 2022-02-15 PROCEDURE — 99213 OFFICE O/P EST LOW 20 MIN: CPT | Mod: GE

## 2022-02-15 NOTE — COUNSELING
[Pregnancy Options] : pregnancy options [STD (testing, results, tx)] : STD (testing, results, tx) [Lab Results] : lab results

## 2022-02-15 NOTE — END OF VISIT
[] : Resident [FreeTextEntry3] : Agree with above. bHCG appropriately rising. Desiring medical termination. Will refer to family planning clinic. Precautions discussed with patient, should come to ED if intense unilateral pelvic pain, heavy vaginal bleeding saturating more than 2 pads per hour over 2 hours. Patient voices understanding.

## 2022-02-15 NOTE — REVIEW OF SYSTEMS
[Fatigue] : fatigue [Nausea] : nausea [Depression] : depression [Fever] : no fever [Chills] : no chills [Cough] : no cough [SOB on Exertion] : no shortness of breath on exertion [Chest Pain] : no chest pain [Abdominal Pain] : no abdominal pain [Constipation] : no constipation [Diarrhea] : diarrhea [Heartburn] : no heartburn [Vomiting] : no vomiting [Melena] : no melena [Bloating] : no bloating [Urgency] : no urgency [Frequency] : no frequency [Dysuria] : no dysuria [Urethral Discharge] : no urethral discharge [Abn Vaginal bleeding] : no abnormal vaginal bleeding [Pelvic pain] : no pelvic pain [Headache] : no headache [Dizziness] : no dizziness [de-identified] : Pt reports compliance with SSRI

## 2022-02-15 NOTE — PHYSICAL EXAM
[Appropriately responsive] : appropriately responsive [Soft] : soft [Non-tender] : non-tender [Non-distended] : non-distended

## 2022-02-15 NOTE — HISTORY OF PRESENT ILLNESS
[FreeTextEntry1] : Ms. Bledsoe is a 20y/o  who presents to clinic for follow up on b-hcg and discussion on family planning options. \par \par Pt was informed that B-HCG increased from 18 on 2022 to 1035 on 2/15. LMP of 1/10/2022. Pt states that in the interim patient has had a positive urine pregnancy test. Pt has considered her options and would like to proceed with a medical termination. Pt reports nausea that started this morning without emesis. Pt denies abdominal pain, vaginal bleeding, CP, SOB, fevers, or chills.

## 2022-02-15 NOTE — PLAN
[FreeTextEntry1] : Ms. Bledsoe is a 22y/o  who presents with a positive b-HCG desiring a medical termination \par \par #Desired medical termination \par - Pt A+. Hg 13.1 \par - b-HCG today is 1035 \par - Plan for sonogram at family planning clinic for confirmation of IUP. \par - Medical history form filled out and faxed to the family planning clinic \par - Discussed ER precautions including abdominal pain, heavy vaginal bleeding, or fevers/ chills. \par \par #AHM \par - Last pap 2020 \par - STI screening on  - NEGATIVE. Results reviewed with the patient. \par \par Appointment made for  at 4:20pm in the family planning clinic. \par DW: Dr. Padilla \par Deja Santacruz, PGY-1

## 2022-02-16 DIAGNOSIS — O36.80X0 PREGNANCY WITH INCONCLUSIVE FETAL VIABILITY, NOT APPLICABLE OR UNSPECIFIED: ICD-10-CM

## 2022-02-23 ENCOUNTER — OUTPATIENT (OUTPATIENT)
Dept: OUTPATIENT SERVICES | Facility: HOSPITAL | Age: 22
LOS: 1 days | End: 2022-02-23

## 2022-02-23 ENCOUNTER — NON-APPOINTMENT (OUTPATIENT)
Age: 22
End: 2022-02-23

## 2022-02-23 ENCOUNTER — APPOINTMENT (OUTPATIENT)
Dept: OBGYN | Facility: HOSPITAL | Age: 22
End: 2022-02-23
Payer: MEDICAID

## 2022-02-23 VITALS
SYSTOLIC BLOOD PRESSURE: 121 MMHG | DIASTOLIC BLOOD PRESSURE: 67 MMHG | BODY MASS INDEX: 30.29 KG/M2 | HEART RATE: 93 BPM | WEIGHT: 164.6 LBS | TEMPERATURE: 98.2 F | HEIGHT: 62 IN

## 2022-02-23 DIAGNOSIS — Z33.2 ENCOUNTER FOR ELECTIVE TERMINATION OF PREGNANCY: ICD-10-CM

## 2022-02-23 DIAGNOSIS — Z90.49 ACQUIRED ABSENCE OF OTHER SPECIFIED PARTS OF DIGESTIVE TRACT: Chronic | ICD-10-CM

## 2022-02-23 DIAGNOSIS — Z34.90 ENCOUNTER FOR SUPERVISION OF NORMAL PREGNANCY, UNSPECIFIED, UNSPECIFIED TRIMESTER: ICD-10-CM

## 2022-02-23 PROCEDURE — 99214 OFFICE O/P EST MOD 30 MIN: CPT | Mod: 25,GC

## 2022-02-23 PROCEDURE — 76815 OB US LIMITED FETUS(S): CPT | Mod: 26,GC

## 2022-02-23 RX ORDER — IBUPROFEN 600 MG/1
600 TABLET, FILM COATED ORAL 4 TIMES DAILY
Qty: 20 | Refills: 0 | Status: ACTIVE | COMMUNITY
Start: 2022-02-23 | End: 1900-01-01

## 2022-02-23 RX ORDER — ONDANSETRON 4 MG/1
4 TABLET, ORALLY DISINTEGRATING ORAL
Qty: 5 | Refills: 0 | Status: ACTIVE | COMMUNITY
Start: 2022-02-23 | End: 1900-01-01

## 2022-02-23 RX ORDER — OXYCODONE AND ACETAMINOPHEN 5; 325 MG/1; MG/1
5-325 TABLET ORAL
Qty: 5 | Refills: 0 | Status: ACTIVE | COMMUNITY
Start: 2022-02-23 | End: 1900-01-01

## 2022-02-23 RX ORDER — MISOPROSTOL 200 UG/1
200 TABLET ORAL
Qty: 8 | Refills: 0 | Status: ACTIVE | COMMUNITY
Start: 2022-02-23 | End: 1900-01-01

## 2022-02-23 NOTE — PROCEDURE
[Transvaginal OB Sonogram] : Transvaginal OB Sonogram [Intrauterine Pregnancy] : intrauterine pregnancy [Yolk Sac] : yolk sac present [CRL: ___ (mm)] : CRL - [unfilled]Umm [Current GA by Sonogram: ___ (wks)] : Current GA by Sonogram: [unfilled]Uwks [___ day(s)] : [unfilled] days

## 2022-02-23 NOTE — PHYSICAL EXAM
[Appropriately responsive] : appropriately responsive [Alert] : alert [No Acute Distress] : no acute distress [Oriented x3] : oriented x3 [Labia Minora] : normal [Labia Majora] : normal [Normal] :  normal

## 2022-02-24 DIAGNOSIS — Z34.90 ENCOUNTER FOR SUPERVISION OF NORMAL PREGNANCY, UNSPECIFIED, UNSPECIFIED TRIMESTER: ICD-10-CM

## 2022-02-24 DIAGNOSIS — N92.6 IRREGULAR MENSTRUATION, UNSPECIFIED: ICD-10-CM

## 2022-03-01 DIAGNOSIS — Z32.2 ENCOUNTER FOR CHILDBIRTH INSTRUCTION: ICD-10-CM

## 2022-03-06 NOTE — HISTORY OF PRESENT ILLNESS
[FreeTextEntry1] : 20yo  at 6w2d by LMP 1/10/2022 presenting for unplanned/undesired pregnancy and desires medical termination. [facility] (): 18 -> (2/15) 1035. Partner is aware of this pregnancy and supports her decision \par \par OB:  2021\par GYN: pap 2020 NIELM\par PMH: Asthma\par PSH: Appendectomy \par All: NKDA\par Meds: Albuterol prn \par Shx: +anxiety and depression, occasional alcohol use, denies toxic habits, feels safe at home\par \par Works as a dancer\par received 2 doses of COVID vaccination\par Fam hx: HTN and DM - mother and gma, schizophrenia - mother\par

## 2022-03-06 NOTE — REVIEW OF SYSTEMS
[Negative] : Heme/Lymph [Fever] : no fever [Chills] : no chills [Chest Pain] : no chest pain [Palpitations] : no palpitations [Abdominal Pain] : no abdominal pain [Urgency] : no urgency [Headache] : no headache

## 2022-03-09 ENCOUNTER — APPOINTMENT (OUTPATIENT)
Dept: OBGYN | Facility: HOSPITAL | Age: 22
End: 2022-03-09

## 2022-03-29 NOTE — OB RN DELIVERY SUMMARY - AMNIOTIC FLUID ODOR, LABOR
Patient, Eddie Parada Sr. (MRN #[id number]), presented with a recent Estimated Glumerular Filtration Rate (EGFR) between 15 and 29 consistent with the definition of chronic kidney disease stage 4 (ICD10 - N18.4).    eGFR if non    Date Value Ref Range Status   03/26/2022 29.9 (A) >60 mL/min/1.73 m^2 Final     Comment:     Calculation used to obtain the estimated glomerular filtration  rate (eGFR) is the CKD-EPI equation.          The patient's chronic kidney disease stage 4 was monitored, evaluated, addressed and/or treated. This addendum to the medical record is made on 03/29/2022.   normal

## 2022-03-30 ENCOUNTER — APPOINTMENT (OUTPATIENT)
Dept: OBGYN | Facility: HOSPITAL | Age: 22
End: 2022-03-30
Payer: MEDICAID

## 2022-03-30 ENCOUNTER — OUTPATIENT (OUTPATIENT)
Dept: OUTPATIENT SERVICES | Facility: HOSPITAL | Age: 22
LOS: 1 days | End: 2022-03-30

## 2022-03-30 VITALS
SYSTOLIC BLOOD PRESSURE: 115 MMHG | BODY MASS INDEX: 32.2 KG/M2 | HEART RATE: 88 BPM | WEIGHT: 175 LBS | TEMPERATURE: 98.2 F | DIASTOLIC BLOOD PRESSURE: 65 MMHG | HEIGHT: 62 IN

## 2022-03-30 DIAGNOSIS — Z87.42 PERSONAL HISTORY OF OTHER DISEASES OF THE FEMALE GENITAL TRACT: ICD-10-CM

## 2022-03-30 DIAGNOSIS — Z90.49 ACQUIRED ABSENCE OF OTHER SPECIFIED PARTS OF DIGESTIVE TRACT: Chronic | ICD-10-CM

## 2022-03-30 PROCEDURE — 99213 OFFICE O/P EST LOW 20 MIN: CPT | Mod: GE,25

## 2022-03-30 PROCEDURE — 76830 TRANSVAGINAL US NON-OB: CPT | Mod: 26

## 2022-03-30 RX ORDER — NORETHINDRONE ACETATE AND ETHINYL ESTRADIOL 1.5; 3 MG/1; UG/1
1.5-3 TABLET ORAL DAILY
Qty: 4 | Refills: 3 | Status: ACTIVE | COMMUNITY
Start: 2022-03-30 | End: 1900-01-01

## 2022-03-30 NOTE — PLAN
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  No signs/symptoms of infection. \par -Pt denies symptoms of pregnancy\par -Reviewed that first period may be heavier than normal. \par -Patient is cleared to return to all physical activities\par \par 2.Contraception\par - Reviewed contraceptive options.  Patient desires contraception and will use JAIME. Sent rx to pharmacy.\par \par 3.  Psych\par - Discussed normal grieving process, reviewed support people\par - Resources were provided to patient in prior visit\par \par 4. Follow-up\par - Patient referred back to her primary Ob-gyn[facility] for routine care\par - Copies of medical records to be forwarded to [facility]\par \par Yakelin Rhoades, PGY2\par D/w Dr. Mejia

## 2022-03-30 NOTE — PROCEDURE
[F/U Medical ] : f/u medical  [Transvaginal Ultrasound] : transvaginal ultrasound [FreeTextEntry3] : EMT: 4.55mm, no blood flow noted

## 2022-03-30 NOTE — HISTORY OF PRESENT ILLNESS
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 yes\par 2.Do you feel like you passed the pregnancy (as if you had a miscarriage)? yes\par a.Did you pass clots/tissue? yes, saw white tissue\par 3.Are your pregnancy symptoms resolving (Nausea/vomiting, breast tenderness)? yes\par 4.Is your bleeding lighter now than the heaviest bleeding after misoprostol? yes\par 5. What was the highest number of pads soaked per hour? 2\par 6. Did you call after hours service? no\par 7. Did you have a fever? no\par 8. Were you see at an ER or by another provider? no\par \par \par

## 2022-03-31 DIAGNOSIS — Z33.2 ENCOUNTER FOR ELECTIVE TERMINATION OF PREGNANCY: ICD-10-CM

## 2022-04-29 NOTE — OB RN PATIENT PROFILE - LIMIT VISITORS, INFANT PROFILE
I am not comfortable prescribing these. He needs to see Rheumatology.
Patient has an appointment with DR Lawanda Wallis on 08/03/2022 @ 2:30 in [place]. He is out of Methotrexate,Xeljanz, and Flexeril. Can he get a refill on these from PCP before his Rheumatology appointment?
no

## 2022-08-09 ENCOUNTER — APPOINTMENT (OUTPATIENT)
Dept: OBGYN | Facility: HOSPITAL | Age: 22
End: 2022-08-09

## 2022-11-21 NOTE — BH SAFETY PLAN - STEP 1 WARNING SIGNS
. Pleurisy       Pleurisy is irritation and swelling of the linings of your lungs. This can cause pain in your chest, back, or shoulder. It can also cause trouble breathing.      What are the causes?    •A lung infection.      •A blood clot that travels to the lungs.      •Injury to the chest.      •Lung cancer or a lung tumor.      •Heart or chest surgery.      •Lung damage from inhalants like smoke or through smoking.      •Certain medicines or treatment for cancer.      •Diseases that can cause irritation and swelling of the lungs.      Sometimes, the cause is not known.      What are the signs or symptoms?    The main symptom is chest pain, usually on one side. The pain starts suddenly and can be sharp, stabbing, or dull. It may get worse when you cough or breathe deep. Other symptoms include:  •Trouble breathing.      •Noisy breathing.      •Cough.      •Chills.      •Fever.      •Coughing up blood.      Symptoms can be worse when you are lying down or lying to one side.      How is this treated?    Treatment depends on the cause. It may include:  •Medicines to help with swelling or inflammation (ibuprofen or naproxen)      •Pain medicine.      •Cough medicine.      •Blood thinners, if your condition was caused by a blood clot.      •Taking the fluid or air out of your lungs with a tube.        Follow these instructions at home:    Medicines     •Take over-the-counter and prescription medicines only as told by your doctor.      •If you were prescribed medicines to remove extra fluid from your lungs (diuretics), take them as told by your doctor.      •If you were prescribed blood thinners, take them exactly as prescribed. This is important.      Activity     •Rest and return to your normal activities as told by your doctor. Ask your doctor what activities are safe for you.      •Ask your health care provider if the medicine prescribed to you requires you to avoid driving or using machinery.        General instructions      •Watch for any changes in how you feel.      •Take deep breaths often, even if it hurts. This can help prevent lung problems.        • Do not use any products that contain nicotine or tobacco, such as cigarettes, e-cigarettes, and chewing tobacco. If you need help quitting, ask your doctor.      •Keep all follow-up visits as told by your doctor. This is important.        Contact a doctor if:  •You have pain that:  •Gets worse.      •Happens more often.      •Does not get better with medicine.        •You have a fever or chills.      •Your cough gets worse.      •You have trouble breathing.      •You cough up mucus or thick spit (phlegm) that looks like pus.        Get help right away if you:    •Cough up blood.    •Have symptoms that get worse. This includes:  •Trouble breathing.      •Feeling like it is hard to breathe.      •Noisy breathing.        •Have pain that spreads into your neck, arms, or jaw.      •Feel faint or dizzy.      These symptoms may be an emergency. Do not wait to see if the symptoms will go away. Get medical help right away. Call your local emergency services (911 in the [place] Do not drive yourself to the hospital.       Summary    •Pleurisy is irritation and swelling of the linings of your lungs.      •Pleurisy can cause pain and trouble breathing.      • Do not use any products that contain nicotine or tobacco, such as cigarettes, e-cigarettes, and chewing tobacco. If you need help quitting, ask your doctor.      •Keep all follow-up visits as told by your doctor. This is important.      This information is not intended to replace advice given to you by your health care provider. Make sure you discuss any questions you have with your health care provider.

## 2024-07-23 NOTE — ED BEHAVIORAL HEALTH ASSESSMENT NOTE - INTERRUPTED ATTEMPT:
AVS   DISCHARGE INSTRUCTIONS  Yumiko Bautista MRN: [id number]     Depression with suicidal ideation   7/21/2024 - 7/23/2024   [facility]   Instructions    Talk with your provider about your medications   ASK how to take:  albuterol 90 mcg/actuation inhaler (PROVENTIL/VENTOLIN HFA)   amLODIPine 5 MG tablet (NORVASC)   fluticasone propionate 50 mcg/actuation nasal spray (FLONASE)   gabapentin 100 MG capsule (NEURONTIN)   lamoTRIgine 200 MG tablet (LAMICTAL)   lisinopriL 10 MG tablet   loratadine 10 mg tablet (CLARITIN)   sertraline 50 MG tablet (ZOLOFT)   SPRINTEC (28) 0.25-35 mg-mcg per tablet (norgestimate-ethinyl estradioL)   Review your updated medication list below.  What's Next  What's Next         Go to [facility]  Friday Aug 2, 2024  As scheduled on Aug.2,2024@2:00p.m.. Please bring insurance card and picture ID upon arrival. [address], [place] [address]  [phone]  FAx: [phone]    All Component Based Labs   07/22/24  0609  07/20/24 2025 07/20/24 2012 07/20/24 2005    Benzodiazepines       Negative    Methadone metabolites       Negative    Phencyclidine       Negative    Acetaminophen Level   <3.0 Low         Albumin   3.6        Alcohol, Serum   <10        ALP   66        ALT   11        Amphetamines, Urine       Negative    Anion Gap   13        Appearance, UA       Clear    AST   16        Barbituates, Urine       Negative    Baso #   0.02        Basophil %   0.3        Bilirubin (UA)       Negative    BILIRUBIN TOTAL   0.3        BUN   9        Calcium   10.1        Chloride   105        Cholesterol Total 144          CO2   20 Low         Cocaine, Urine       Negative    Color, UA       Yellow    Creatinine   0.8        Urine Creatinine       115.2    Differential Method   Automated        eGFR   >60        Eos #   0.1        Eos %   0.8        Estimated Avg Glucose 94          Glucose   89        Glucose, UA       Negative    Gran # (ANC)   4.4        Gran  %   66.7        HDL 84 High           HDL/Cholesterol Ratio 58.3 High           Hematocrit   36.4 Low         Hemoglobin   11.4 Low         Hemoglobin A1C External 4.9          Hepatitis C Ab   Negative        HEP C Virus Hold Specimen   Hold for HCV sendout        HIV 1/2 Ag/Ab   Negative        Immature Grans (Abs)   0.02        Immature Granulocytes   0.3        Ketones, UA       Negative    LDL Cholesterol 40.6 Low           Leukocyte Esterase, UA       Trace Abnormal     Lymph #   1.6        Lymph %   23.8        MCH   22.0 Low         MCHC   31.3 Low         MCV   70 Low         Microscopic Comment       SEE COMMENT    Mono #   0.5        Mono %   8.1        MPV   10.2        NITRITE UA       Negative    Non-HDL Cholesterol 60          nRBC   0        Blood, UA       1+ Abnormal     Opiates, Urine       Negative    pH, UA       7.0    Platelet Count   306        Potassium   4.5        hCG Qualitative, Urine       Negative    PROTEIN TOTAL   7.8        Protein, UA       Negative    RBC   5.18        RBC, UA       2    RDW   20.0 High         SARS-CoV-2 RNA, Amplification, Qual     Negative      Sodium   138        Spec Grav UA       1.020    Specimen UA       Urine, Clean Catch    Squam Epithel, UA       6    Marijuana (THC) Metabolite       Negative    Total Cholesterol/HDL Ratio 1.7 Low           Toxicology Information       SEE COMMENT    Triglycerides 97          TSH   1.466        UROBILINOGEN UA       Negative    WBC, UA       4    WBC   6.55                    Your care is important to us. If your provider recommended a follow-up appointment or test, we are happy to help you coordinate your recommended care. It is important that you complete your recommended follow-up.  If you need help scheduling, please call [phone]. Appointments can also be made online through the patient portal.      While scheduling and attending your appointments is your responsibility, our goal is to support and empower you  "throughout that process.         Your Diagnoses at Discharge   Depression with suicidal ideation  Essential hypertension  Reason for Admission  Per [facility] "patient states that she had a stressful day and considered suicide by jumping off of a bridge. States compliant with medications Denies alcohol or illicit drugs, depressed, flat affect,positive for suicidal ideation."  You are allergic to the following  You are allergic to the following  No active allergies    Your Latest Vitals    Blood Pressure 128/72       BMI 28.20       Weight 172 lb 1.1 oz       Height 5' 5.5"       Temperature 97.5 °F       Pulse 87       Respiration 16       Oxygen Saturation 100%       BSA 1.9 m²  Treatment Team  Chat With All Treatment Team   Provider Role Specialty   Killian Hernandes III, MD  Attending Psychiatry   Killian Hernandes III, MD  Admitting Psychiatry    Recent Lab Values   Include labs without results  Most Recent Result    A1C  4.9  07/22 0609  Most Recent 8 Results  Show dates as rows  Blood Glucose and Lipid Panel Labs   Include labs without results  Most Recent Result from Past 365 Days    Cholesterol  144  07/22 0609  Triglycerides  97  07/22 0609  HDL Cholesterol  84  07/22 0609  LDL Cholesterol  40.6  07/22 0609  HDL/Cholesterol Ratio  58.3  07/22 0609  Total Cholesterol/HDL Ratio  1.7  07/22 0609  Non-HDL Cholesterol  60  07/22 0609  Most Recent 8 Results  Show dates as rows  Pending Labs/Studies  You have no pending labs/studies.  Contact Information  Call [phone] (anytime, 7 days a week) to:  ? Report concerns regarding hospital stay  ? Ask questions about your hospital stay and home care plan  ? Get new or updated results of your lab tests and other procedures  [facility]  [facility] Nurse Care Line - 24/7 Assistance  Unless otherwise directed by your provider, please contact [facility], our nurse care line that is available for 24/7 assistance. Please refer to the Patient Instructions section of " "your After Visit Summary for specific instructions from your physician.     Registered nurses in the [facility] provide appointment scheduling, clinical advisement, health education, and other advisory services.  Call: [phone] (toll free).  Advance Directives  An advance directive is a document which, in the event you are no longer able to make decisions for yourself, tells your healthcare team what kind of treatment you do or do not want to receive, or who you would like to make those decisions for you.  If you do not currently have an advance directive, [facility] encourages you to create one.  For more information call:  [phone]), [phone] ([phone]),  or log on to [email or web address].  Advance Directive Status  Flowsheet Row Most Recent Value  Advance Directive Status Patient does not have Advance Directive, declines information.    Behavioral Health Safety Plan     Step 1: Warning Signs:  1.   Pt stated" I get annoyed".  2.   Pt stated" I withdraw from everyone".  3.   Pt stated" I get irritable".     Step 2: Internal coping strategies - Things I can do to take my mind off my problems without contacting another person:  1.   Pt stated" I go for a walk".  2.   Pt stated" I watch t.v."  3.   Pt stated" I take deep breaths".     Step 3: People and social settings that provide distraction:  1.   Name: Anne/friend Phone: In cell phone  2.   Name: Madiha Bautista/sister Phone: In cell phone  3.   Place: Pt stated" I jsut talk to her on the phone".  4.   Place: Pt stated: I go and vist her sometimes at her home".     Step 4: People whom I can ask for help:  1.   Name: Deepa Bautista/sister Phone: In cell phone  2.   Name: Madiha Bautista/sister Phone: In cell phone  3.   Name: Levi Bautista/brother Phone: In cell phone     Step 5: Professionals or agencies I can contact during a crisis:  1.   Clinician Name: [facility] Phone: [phone]    Clinician Pager or " "Emergency Contact #: [phone]       2.   Clinician Name: [facility] Phone: [phone]    Clinician Pager or Emergency Contact #: [phone]       3.   Suicide Prevention Lifeline: 988 or 1-951-659-POLZ (5052)       4.   Local Emergency Service: [facility] Department    Emergency Services Address: [address] [place] [address]    Emergency Services Phone: 911,-746 [place] Crisis Line, [place] Line, [place] Crisis and Suicide Hotline     Step 6: Making the environment safer:  1.   Pt stated" To tell people to not talk about certain things around me so it will not trigger bad feelings".  2. Pt stated" My family".       Used with permission from HUMBERTO Brooks & FRANCK Arthur (2011). Safety planning intervention: A brief intervention to mitigate suicide risk. Cognitive and Behavioral Practice. 19, 256-264           Language Assistance Services  ATTENTION: Language assistance services are available, free of charge. Please call [phone].       ATENCIÓN: Si habla español, tiene a mena disposición servicios gratuitos de asistencia lingüística. Llame al [phone].     CHÚ Ý: N?u b?n nói Ti?ng Vi?t, có các d?ch v? h? tr? ngôn ng? mi?n phí dành cho b?n. G?i s? [phone].  National Suicide Prevention Lifeline  If you or someone you know is thinking about suicide, call the National Suicide Prevention Lifeline using the 3 digit phone number of 110 or 3-949-287-YNMZ (4668).  The lifeline is free, confidential and always available.  Help a loved one, a friend or yourself.  www.suicidepreventionlifeline.org     Medication list    ASK your doctor about these medications  ASK your doctor about these medications    Additional info        albuterol 90 mcg/actuation inhaler  Commonly known as: PROVENTIL/VENTOLIN HFA  Refills: 0  Begin Date AM Noon PM Bedtime   amLODIPine 5 MG tablet  Commonly known as: NORVASC  Refills: 0  Dose: 5 mg Last " time this was given: July 23, 2024  8:03 AM  Take 5 mg by mouth. Begin Date AM Noon PM Bedtime   fluticasone propionate 50 mcg/actuation nasal spray  Commonly known as: FLONASE  Refills: 0  Dose: 1 spray Last time this was given: July 23, 2024  8:01 AM  1 spray by Each Nostril route once daily. Begin Date AM Noon PM Bedtime   gabapentin 100 MG capsule  Commonly known as: NEURONTIN  Refills: 0 TAKE 2 CAPSULES BY MOUTH TWICE DAILY AS NEEDED FOR ANXIETY Begin Date AM Noon PM Bedtime   lamoTRIgine 200 MG tablet  Commonly known as: LAMICTAL  Refills: 0  Dose: 200 mg Last time this was given: July 22, 2024  8:02 PM  Take 200 mg by mouth once daily. Begin Date AM Noon PM Bedtime   lisinopriL 10 MG tablet  Refills: 0 Last time this was given: July 23, 2024  7:59 AM Begin Date AM Noon PM Bedtime   loratadine 10 mg tablet  Commonly known as: CLARITIN  Refills: 0  Dose: 10 mg Take 10 mg by mouth. Begin Date AM Noon PM Bedtime   sertraline 50 MG tablet  Commonly known as: ZOLOFT  Refills: 0 Last time this was given: July 23, 2024  8:00 AM Begin Date AM Noon PM Bedtime   SPRINTEC (28) 0.25-35 mg-mcg per tablet  Quantity: 28 tablet  Refills: 1  Dose: 1 tablet  Generic drug: norgestimate-ethinyl estradioL             None known
